# Patient Record
Sex: FEMALE | Race: WHITE | Employment: FULL TIME | URBAN - METROPOLITAN AREA
[De-identification: names, ages, dates, MRNs, and addresses within clinical notes are randomized per-mention and may not be internally consistent; named-entity substitution may affect disease eponyms.]

---

## 2017-03-07 ENCOUNTER — HOSPITAL ENCOUNTER (OUTPATIENT)
Dept: OTHER | Age: 57
Discharge: HOME | End: 2017-03-07
Attending: NURSE PRACTITIONER | Admitting: NURSE PRACTITIONER

## 2017-03-07 DIAGNOSIS — E03.9 ACQUIRED HYPOTHYROIDISM: ICD-10-CM

## 2017-03-07 DIAGNOSIS — R53.82 CHRONIC FATIGUE: ICD-10-CM

## 2017-03-07 LAB
BASOPHILS ABSOLUTE: 0 K/UL (ref 0–0.2)
BASOPHILS RELATIVE PERCENT: 0.7 %
CHOLESTEROL, TOTAL: 192 MG/DL (ref 0–199)
EOSINOPHILS ABSOLUTE: 0.1 K/UL (ref 0–0.6)
EOSINOPHILS RELATIVE PERCENT: 1.7 %
FERRITIN: 81.3 NG/ML (ref 15–150)
HCT VFR BLD CALC: 44 % (ref 36–48)
HDLC SERPL-MCNC: 63 MG/DL (ref 40–60)
HEMOGLOBIN: 14.1 G/DL (ref 12–16)
IRON SATURATION: 49 % (ref 15–50)
IRON: 158 UG/DL (ref 37–145)
LDL CHOLESTEROL CALCULATED: 111 MG/DL
LYMPHOCYTES ABSOLUTE: 1.7 K/UL (ref 1–5.1)
LYMPHOCYTES RELATIVE PERCENT: 30.9 %
MCH RBC QN AUTO: 30.6 PG (ref 26–34)
MCHC RBC AUTO-ENTMCNC: 32.1 G/DL (ref 31–36)
MCV RBC AUTO: 95.1 FL (ref 80–100)
MONOCYTES ABSOLUTE: 0.4 K/UL (ref 0–1.3)
MONOCYTES RELATIVE PERCENT: 7.6 %
NEUTROPHILS ABSOLUTE: 3.2 K/UL (ref 1.7–7.7)
NEUTROPHILS RELATIVE PERCENT: 59.1 %
PDW BLD-RTO: 12.2 % (ref 12.4–15.4)
PLATELET # BLD: 310 K/UL (ref 135–450)
PMV BLD AUTO: 9 FL (ref 5–10.5)
RBC # BLD: 4.63 M/UL (ref 4–5.2)
T3 FREE: 4.6 PG/ML (ref 2.3–4.2)
T4 FREE: 1.1 NG/DL (ref 0.9–1.8)
TOTAL IRON BINDING CAPACITY: 320 UG/DL (ref 260–445)
TRIGL SERPL-MCNC: 92 MG/DL (ref 0–150)
TSH SERPL DL<=0.05 MIU/L-ACNC: 1.95 UIU/ML (ref 0.27–4.2)
VLDLC SERPL CALC-MCNC: 18 MG/DL
WBC # BLD: 5.5 K/UL (ref 4–11)

## 2017-03-09 LAB — T3 REVERSE: 12.2 NG/DL (ref 9–27)

## 2017-03-14 ENCOUNTER — OFFICE VISIT (OUTPATIENT)
Dept: ENDOCRINOLOGY | Age: 57
End: 2017-03-14

## 2017-03-14 VITALS
SYSTOLIC BLOOD PRESSURE: 120 MMHG | OXYGEN SATURATION: 98 % | HEIGHT: 67 IN | HEART RATE: 54 BPM | WEIGHT: 173.2 LBS | BODY MASS INDEX: 27.18 KG/M2 | DIASTOLIC BLOOD PRESSURE: 76 MMHG

## 2017-03-14 DIAGNOSIS — E83.19 IRON OVERLOAD: ICD-10-CM

## 2017-03-14 DIAGNOSIS — E03.9 ACQUIRED HYPOTHYROIDISM: Primary | ICD-10-CM

## 2017-03-14 PROCEDURE — 99213 OFFICE O/P EST LOW 20 MIN: CPT | Performed by: NURSE PRACTITIONER

## 2017-03-14 RX ORDER — LEVOTHYROXINE SODIUM 0.05 MG/1
TABLET ORAL
Qty: 90 TABLET | Refills: 1 | Status: SHIPPED | OUTPATIENT
Start: 2017-03-14 | End: 2018-07-10 | Stop reason: SDUPTHER

## 2017-03-14 RX ORDER — ACETAMINOPHEN 160 MG
TABLET,DISINTEGRATING ORAL
COMMUNITY
End: 2018-07-10 | Stop reason: ALTCHOICE

## 2017-03-14 RX ORDER — CALCIUM CARBONATE 500(1250)
500 TABLET ORAL DAILY
COMMUNITY
End: 2018-07-10 | Stop reason: ALTCHOICE

## 2017-03-14 RX ORDER — NORETHINDRONE ACETATE AND ETHINYL ESTRADIOL 1; 5 MG/1; UG/1
1 TABLET ORAL DAILY
COMMUNITY

## 2017-03-14 RX ORDER — LIOTHYRONINE SODIUM 5 UG/1
TABLET ORAL
Qty: 180 TABLET | Refills: 1 | Status: SHIPPED | OUTPATIENT
Start: 2017-03-14 | End: 2017-05-09

## 2017-04-10 LAB
HPV COMMENT: NORMAL
HPV TYPE 16: NOT DETECTED
HPV TYPE 18: NOT DETECTED
HPVOH (OTHER TYPES): NOT DETECTED

## 2017-07-31 ENCOUNTER — TELEPHONE (OUTPATIENT)
Dept: INTERNAL MEDICINE | Age: 57
End: 2017-07-31

## 2017-09-19 ENCOUNTER — TELEPHONE (OUTPATIENT)
Dept: INTERNAL MEDICINE | Age: 57
End: 2017-09-19

## 2017-09-28 ENCOUNTER — OFFICE VISIT (OUTPATIENT)
Dept: INTERNAL MEDICINE | Age: 57
End: 2017-09-28

## 2017-09-28 VITALS
SYSTOLIC BLOOD PRESSURE: 116 MMHG | HEIGHT: 67 IN | DIASTOLIC BLOOD PRESSURE: 70 MMHG | WEIGHT: 175 LBS | BODY MASS INDEX: 27.47 KG/M2

## 2017-09-28 DIAGNOSIS — F51.01 PRIMARY INSOMNIA: ICD-10-CM

## 2017-09-28 DIAGNOSIS — M84.369S STRESS FRACTURE OF TIBIA, UNSPECIFIED LATERALITY, SEQUELA: ICD-10-CM

## 2017-09-28 DIAGNOSIS — R53.83 FATIGUE, UNSPECIFIED TYPE: ICD-10-CM

## 2017-09-28 DIAGNOSIS — Z00.00 WELL ADULT EXAM: Primary | ICD-10-CM

## 2017-09-28 DIAGNOSIS — E03.9 ACQUIRED HYPOTHYROIDISM: ICD-10-CM

## 2017-09-28 DIAGNOSIS — F41.9 ANXIETY: ICD-10-CM

## 2017-09-28 DIAGNOSIS — I10 ESSENTIAL HYPERTENSION: ICD-10-CM

## 2017-09-28 PROBLEM — M84.369A STRESS FRACTURE OF TIBIA: Status: ACTIVE | Noted: 2017-09-28

## 2017-09-28 PROCEDURE — 99396 PREV VISIT EST AGE 40-64: CPT | Performed by: INTERNAL MEDICINE

## 2017-09-28 RX ORDER — LORAZEPAM 0.5 MG/1
0.5 TABLET ORAL EVERY 8 HOURS PRN
Qty: 15 TABLET | Refills: 0 | Status: SHIPPED | OUTPATIENT
Start: 2017-09-28 | End: 2017-10-28

## 2017-09-28 RX ORDER — NADOLOL 20 MG/1
20 TABLET ORAL NIGHTLY
Qty: 90 TABLET | Refills: 3 | Status: SHIPPED | OUTPATIENT
Start: 2017-09-28 | End: 2018-09-02 | Stop reason: SDUPTHER

## 2017-09-28 ASSESSMENT — ENCOUNTER SYMPTOMS
BACK PAIN: 0
ABDOMINAL PAIN: 0
SHORTNESS OF BREATH: 0
COLOR CHANGE: 0
WHEEZING: 0
DIARRHEA: 0
CHEST TIGHTNESS: 0

## 2017-10-10 PROBLEM — D36.9 ADENOMATOUS POLYP: Status: ACTIVE | Noted: 2017-10-10

## 2017-10-31 ENCOUNTER — TELEPHONE (OUTPATIENT)
Dept: INTERNAL MEDICINE | Age: 57
End: 2017-10-31

## 2017-10-31 DIAGNOSIS — F41.9 ANXIETY: ICD-10-CM

## 2017-10-31 RX ORDER — CITALOPRAM 20 MG/1
20 TABLET ORAL NIGHTLY
Qty: 30 TABLET | Refills: 3 | Status: SHIPPED | OUTPATIENT
Start: 2017-10-31 | End: 2018-02-19 | Stop reason: SDUPTHER

## 2017-10-31 NOTE — TELEPHONE ENCOUNTER
Patient was returning MD called please call pt on her cell phone and you can leave a voice mail [phone]

## 2018-02-19 RX ORDER — CITALOPRAM 20 MG/1
TABLET ORAL
Qty: 30 TABLET | Refills: 0 | Status: SHIPPED | OUTPATIENT
Start: 2018-02-19 | End: 2018-03-18 | Stop reason: SDUPTHER

## 2018-02-26 ENCOUNTER — HOSPITAL ENCOUNTER (OUTPATIENT)
Dept: ENDOSCOPY | Age: 58
Discharge: HOME | End: 2018-02-26
Attending: OBSTETRICS & GYNECOLOGY | Admitting: OBSTETRICS & GYNECOLOGY

## 2018-02-26 NOTE — ANESTHESIA PRE-OP
Department of Anesthesiology  Preprocedure Note       Name:  Devora Reynolds   Age:  56 y.o.  :  1960                                          MRN:  [id number]         Date:  2018      Surgeon:    Procedure:    Medications prior to admission:   Prior to Admission medications    Medication Sig Start Date End Date Taking?  Authorizing Provider   citalopram (CELEXA) 20 MG tablet TAKE 1 TABLET BY MOUTH EVERY NIGHT 18   Rockland Leyden, MD   nadolol (CORGARD) 20 MG tablet Take 1 tablet by mouth nightly 17   Rockland Leyden, MD   liothyronine (CYTOMEL) 5 MCG tablet TAKE 2 TABLETS BY MOUTH DAILY 17   RODNEY Modi   norethindrone-ethinyl estradiol (JINTELI) 1-5 MG-MCG TABS per tablet Take 1 tablet by mouth daily    Historical Provider, MD   Multiple Vitamins-Minerals (MULTIVITAMIN ADULT PO) Take by mouth    Historical Provider, MD   calcium carbonate (OSCAL) 500 MG TABS tablet Take 500 mg by mouth daily    Historical Provider, MD   Cholecalciferol (VITAMIN D3) 2000 UNITS CAPS Take by mouth    Historical Provider, MD   Probiotic Product (PROBIOTIC ADVANCED PO) Take by mouth    Historical Provider, MD   levothyroxine (SYNTHROID) 50 MCG tablet TAKE 1 TABLET BY MOUTH DAILY 3/14/17   RODNEY Modi   naproxen (NAPROSYN) 500 MG tablet Take 500 mg by mouth as needed     Historical Provider, MD   loratadine (CLARITIN) 10 MG tablet Take 10 mg by mouth    Historical Provider, MD       Current medications:    Current Outpatient Prescriptions   Medication Sig Dispense Refill    citalopram (CELEXA) 20 MG tablet TAKE 1 TABLET BY MOUTH EVERY NIGHT 30 tablet 0    nadolol (CORGARD) 20 MG tablet Take 1 tablet by mouth nightly 90 tablet 3    liothyronine (CYTOMEL) 5 MCG tablet TAKE 2 TABLETS BY MOUTH DAILY 60 tablet 2    norethindrone-ethinyl estradiol (JINTELI) 1-5 MG-MCG TABS per tablet Take 1 tablet by mouth daily      Multiple Vitamins-Minerals (MULTIVITAMIN ADULT PO) Take by mouth      calcium carbonate (OSCAL) 500 MG TABS tablet Take 500 mg by mouth daily      Cholecalciferol (VITAMIN D3) 2000 UNITS CAPS Take by mouth      Probiotic Product (PROBIOTIC ADVANCED PO) Take by mouth      levothyroxine (SYNTHROID) 50 MCG tablet TAKE 1 TABLET BY MOUTH DAILY 90 tablet 1    naproxen (NAPROSYN) 500 MG tablet Take 500 mg by mouth as needed       loratadine (CLARITIN) 10 MG tablet Take 10 mg by mouth       No current facility-administered medications for this encounter.         Allergies:  No Known Allergies    Problem List:    Patient Active Problem List   Diagnosis Code    Foot pain M79.673    Family history of diabetes mellitus Z83.3    Hypertension I10    Mitral valve prolapse I34.1    Early menopause E28.319    Tendonitis, tibialis M76.829    Well adult exam Z00.00    PAC (premature atrial contraction) I49.1    Insomnia G47.00    Anxiety F41.9    Visual disturbance H53.9    Ocular muscular dystrophy ([address] G71.0    Headache R51    Fatigue R53.83    Bilateral knee pain M25.561, M25.562    Muscle tightness hamstrings M62.89    Chondromalacia patellae of left knee M22.42    Right knee pain M25.561    Left knee pain M25.562    Bilateral closed proximal tibial stress fracture S82.101A, S82.102A    Hypothyroidism E03.9    Osteopenia M85.80    Menopausal state N95.1    Constipation K59.00    Stress fracture of tibia M84.369A    Adenomatous polyp D36.9       Past Medical History:        Diagnosis Date    Hypothyroidism     Menopausal state age 48    MVP (mitral valve prolapse)     last echo 9/2006 showed MVP otherwise nl-no regurg or stenosis of MV    Osteopenia 2010       Past Surgical History:        Procedure Laterality Date    BREAST ENHANCEMENT SURGERY      x3    COLONOSCOPY  2013    adenomatous polyp 2-13 for 7 yr marva    DILATION AND CURETTAGE OF UTERUS         Social History:    Social History   Substance Use Topics    Smoking status: Never Smoker    Smokeless tobacco:

## 2018-02-28 ENCOUNTER — HOSPITAL ENCOUNTER (OUTPATIENT)
Dept: MAMMOGRAPHY | Age: 58
Discharge: HOME | End: 2018-02-28
Attending: OBSTETRICS & GYNECOLOGY | Admitting: OBSTETRICS & GYNECOLOGY

## 2018-02-28 DIAGNOSIS — Z12.39 BREAST CANCER SCREENING: ICD-10-CM

## 2018-07-10 ENCOUNTER — OFFICE VISIT (OUTPATIENT)
Dept: ENDOCRINOLOGY | Age: 58
End: 2018-07-10

## 2018-07-10 VITALS
WEIGHT: 180 LBS | HEART RATE: 55 BPM | SYSTOLIC BLOOD PRESSURE: 116 MMHG | DIASTOLIC BLOOD PRESSURE: 72 MMHG | OXYGEN SATURATION: 98 % | HEIGHT: 67 IN | BODY MASS INDEX: 28.25 KG/M2

## 2018-07-10 DIAGNOSIS — E04.9 GOITER: ICD-10-CM

## 2018-07-10 DIAGNOSIS — M85.80 OSTEOPENIA, UNSPECIFIED LOCATION: ICD-10-CM

## 2018-07-10 DIAGNOSIS — Z78.0 MENOPAUSE: ICD-10-CM

## 2018-07-10 DIAGNOSIS — E03.9 ACQUIRED HYPOTHYROIDISM: Primary | ICD-10-CM

## 2018-07-10 PROCEDURE — 99214 OFFICE O/P EST MOD 30 MIN: CPT | Performed by: INTERNAL MEDICINE

## 2018-07-10 RX ORDER — LIOTHYRONINE SODIUM 5 UG/1
TABLET ORAL
Qty: 60 TABLET | Refills: 5 | Status: SHIPPED | OUTPATIENT
Start: 2018-07-10 | End: 2019-01-11 | Stop reason: SDUPTHER

## 2018-07-10 RX ORDER — LEVOTHYROXINE SODIUM 0.05 MG/1
TABLET ORAL
Qty: 30 TABLET | Refills: 5 | Status: SHIPPED | OUTPATIENT
Start: 2018-07-10 | End: 2019-01-11 | Stop reason: SDUPTHER

## 2018-07-10 ASSESSMENT — PATIENT HEALTH QUESTIONNAIRE - PHQ9
SUM OF ALL RESPONSES TO PHQ QUESTIONS 1-9: 0
1. LITTLE INTEREST OR PLEASURE IN DOING THINGS: 0
2. FEELING DOWN, DEPRESSED OR HOPELESS: 0
SUM OF ALL RESPONSES TO PHQ9 QUESTIONS 1 & 2: 0

## 2018-07-10 NOTE — PROGRESS NOTES
has depression, has insomnia  Hematologic/Lymphatic: no tendency for easy bleeding, no swollen lymph nodes, no tendency for easy bruising  Immunology: has seasonal allergies, no frequent infections, no frequent illnesses  Endocrine: no temperature intolerance, has hot flashes    /72 (Site: Left Arm, Position: Sitting, Cuff Size: Medium Adult)   Pulse 55   Ht 5' 7\" (1.702 m)   Wt 180 lb (81.6 kg)   SpO2 98%   BMI 28.19 kg/m²    Wt Readings from Last 3 Encounters:   07/10/18 180 lb (81.6 kg)   02/07/18 170 lb (77.1 kg)   09/28/17 175 lb (79.4 kg)     Body mass index is 28.19 kg/m².     OBJECTIVE:  Constitutional: no acute distress, well appearing and well nourished  Psychiatric: oriented to person, place and time, judgement and insight and normal, recent and remote memory and intact and mood and affect are normal  Skin: skin and subcutaneous tissue is normal without mass, normal turgor  Head and Face: examination of head and face revealed no abnormalities  Eyes: no lid or conjunctival swelling, erythema or discharge, pupils are normal, equal, round, reactive to light  Ears/Nose: external inspection of ears and nose revealed no abnormalities, hearing is grossly normal  Oropharynx/Mouth/Face: lips, tongue and gums are normal with no lesions, the voice quality was normal  Neck: neck is supple and symmetric, with midline trachea and no masses, thyroid is enlarged  Lymphatics: normal cervical lymph nodes, normal supraclavicular nodes  Pulmonary: no increased work of breathing or signs of respiratory distress, lungs are clear to auscultation  Cardiovascular: normal heart rate and rhythm, normal S1 and S2, no murmurs and pedal pulses and 2+ bilaterally, No edema  Abdomen: abdomen is soft, non-tender with no masses  Musculoskeletal: normal gait and station and exam of the digits and nails are normal  Neurological: normal coordination and normal general cortical function      Lab Review:    Lab Results   Component

## 2018-07-19 ENCOUNTER — HOSPITAL ENCOUNTER (OUTPATIENT)
Dept: OTHER | Age: 58
Discharge: HOME | End: 2018-07-19
Attending: INTERNAL MEDICINE | Admitting: INTERNAL MEDICINE

## 2018-07-19 DIAGNOSIS — E03.9 ACQUIRED HYPOTHYROIDISM: ICD-10-CM

## 2018-07-19 LAB
A/G RATIO: 1.4 (ref 1.1–2.2)
ALBUMIN SERPL-MCNC: 4.1 G/DL (ref 3.4–5)
ALP BLD-CCNC: 80 U/L (ref 40–129)
ALT SERPL-CCNC: 16 U/L (ref 10–40)
ANION GAP SERPL CALCULATED.3IONS-SCNC: 10 MMOL/L (ref 3–16)
AST SERPL-CCNC: 21 U/L (ref 15–37)
BILIRUB SERPL-MCNC: 0.5 MG/DL (ref 0–1)
BUN BLDV-MCNC: 12 MG/DL (ref 7–20)
CALCIUM SERPL-MCNC: 9.1 MG/DL (ref 8.3–10.6)
CHLORIDE BLD-SCNC: 105 MMOL/L (ref 99–110)
CO2: 27 MMOL/L (ref 21–32)
CREAT SERPL-MCNC: 1 MG/DL (ref 0.6–1.1)
GFR AFRICAN AMERICAN: >60
GFR NON-AFRICAN AMERICAN: 57
GLOBULIN: 2.9 G/DL
GLUCOSE BLD-MCNC: 85 MG/DL (ref 70–99)
POTASSIUM SERPL-SCNC: 4.3 MMOL/L (ref 3.5–5.1)
SODIUM BLD-SCNC: 142 MMOL/L (ref 136–145)
T3 FREE: 3.1 PG/ML (ref 2.3–4.2)
T4 FREE: 1 NG/DL (ref 0.9–1.8)
TOTAL PROTEIN: 7 G/DL (ref 6.4–8.2)
TSH SERPL DL<=0.05 MIU/L-ACNC: 2.55 UIU/ML (ref 0.27–4.2)

## 2018-07-21 LAB — T3 REVERSE: 13.3 NG/DL (ref 9–27)

## 2018-07-23 ENCOUNTER — HOSPITAL ENCOUNTER (OUTPATIENT)
Dept: GENERAL RADIOLOGY | Age: 58
Discharge: HOME | End: 2018-07-23
Admitting: INTERNAL MEDICINE

## 2018-07-23 DIAGNOSIS — M85.80 OSTEOPENIA, UNSPECIFIED LOCATION: ICD-10-CM

## 2018-07-23 DIAGNOSIS — N95.1 FEMALE CLIMACTERIC STATE: ICD-10-CM

## 2018-07-23 DIAGNOSIS — E04.9 GOITER: ICD-10-CM

## 2018-07-23 DIAGNOSIS — Z78.0 MENOPAUSE: ICD-10-CM

## 2018-09-04 RX ORDER — NADOLOL 20 MG/1
TABLET ORAL
Qty: 90 TABLET | Refills: 0 | Status: SHIPPED | OUTPATIENT
Start: 2018-09-04 | End: 2018-12-02 | Stop reason: SDUPTHER

## 2018-10-08 RX ORDER — CITALOPRAM 20 MG/1
TABLET ORAL
Qty: 30 TABLET | Refills: 2 | Status: SHIPPED | OUTPATIENT
Start: 2018-10-08 | End: 2019-01-03 | Stop reason: SDUPTHER

## 2019-01-11 ENCOUNTER — OFFICE VISIT (OUTPATIENT)
Dept: ENDOCRINOLOGY | Age: 59
End: 2019-01-11
Payer: COMMERCIAL

## 2019-01-11 ENCOUNTER — OFFICE VISIT (OUTPATIENT)
Dept: INTERNAL MEDICINE CLINIC | Age: 59
End: 2019-01-11
Payer: COMMERCIAL

## 2019-01-11 VITALS
SYSTOLIC BLOOD PRESSURE: 118 MMHG | BODY MASS INDEX: 28.09 KG/M2 | DIASTOLIC BLOOD PRESSURE: 78 MMHG | HEIGHT: 67 IN | WEIGHT: 179 LBS

## 2019-01-11 VITALS
SYSTOLIC BLOOD PRESSURE: 126 MMHG | WEIGHT: 178.8 LBS | HEIGHT: 67 IN | DIASTOLIC BLOOD PRESSURE: 60 MMHG | HEART RATE: 66 BPM | BODY MASS INDEX: 28.06 KG/M2

## 2019-01-11 DIAGNOSIS — S92.355A NONDISPLACED FRACTURE OF FIFTH METATARSAL BONE, LEFT FOOT, INITIAL ENCOUNTER FOR CLOSED FRACTURE: ICD-10-CM

## 2019-01-11 DIAGNOSIS — E03.9 ACQUIRED HYPOTHYROIDISM: ICD-10-CM

## 2019-01-11 DIAGNOSIS — D36.9 ADENOMATOUS POLYP: ICD-10-CM

## 2019-01-11 DIAGNOSIS — E03.9 ACQUIRED HYPOTHYROIDISM: Primary | ICD-10-CM

## 2019-01-11 DIAGNOSIS — F41.9 ANXIETY: ICD-10-CM

## 2019-01-11 DIAGNOSIS — I10 ESSENTIAL HYPERTENSION: ICD-10-CM

## 2019-01-11 DIAGNOSIS — Z00.00 WELL ADULT EXAM: Primary | ICD-10-CM

## 2019-01-11 DIAGNOSIS — M85.80 OSTEOPENIA, UNSPECIFIED LOCATION: ICD-10-CM

## 2019-01-11 DIAGNOSIS — Z78.0 MENOPAUSE: ICD-10-CM

## 2019-01-11 PROCEDURE — 99396 PREV VISIT EST AGE 40-64: CPT | Performed by: INTERNAL MEDICINE

## 2019-01-11 PROCEDURE — 99214 OFFICE O/P EST MOD 30 MIN: CPT | Performed by: INTERNAL MEDICINE

## 2019-01-11 RX ORDER — LEVOTHYROXINE SODIUM 0.05 MG/1
TABLET ORAL
Qty: 30 TABLET | Refills: 5 | Status: SHIPPED | OUTPATIENT
Start: 2019-01-11 | End: 2019-10-01 | Stop reason: SDUPTHER

## 2019-01-11 RX ORDER — LIOTHYRONINE SODIUM 5 UG/1
TABLET ORAL
Qty: 60 TABLET | Refills: 5 | Status: SHIPPED | OUTPATIENT
Start: 2019-01-11 | End: 2019-10-01 | Stop reason: SDUPTHER

## 2019-01-11 RX ORDER — NADOLOL 20 MG/1
TABLET ORAL
Qty: 90 TABLET | Refills: 3 | Status: SHIPPED | OUTPATIENT
Start: 2019-01-11 | End: 2019-03-01 | Stop reason: SDUPTHER

## 2019-01-11 RX ORDER — CITALOPRAM 20 MG/1
TABLET ORAL
Qty: 90 TABLET | Refills: 3 | Status: SHIPPED | OUTPATIENT
Start: 2019-01-11 | End: 2020-01-02

## 2019-01-12 ASSESSMENT — ENCOUNTER SYMPTOMS
COLOR CHANGE: 0
CHEST TIGHTNESS: 0
WHEEZING: 0
BACK PAIN: 0
ABDOMINAL PAIN: 0

## 2019-02-17 ENCOUNTER — TELEPHONE (OUTPATIENT)
Dept: INTERNAL MEDICINE CLINIC | Age: 59
End: 2019-02-17

## 2019-02-22 ENCOUNTER — HOSPITAL ENCOUNTER (OUTPATIENT)
Dept: OCCUPATIONAL THERAPY | Age: 59
Setting detail: THERAPIES SERIES
Discharge: HOME | End: 2019-02-22
Payer: COMMERCIAL

## 2019-02-22 PROCEDURE — 97165 OT EVAL LOW COMPLEX 30 MIN: CPT

## 2019-02-22 PROCEDURE — 97018 PARAFFIN BATH THERAPY: CPT

## 2019-02-22 PROCEDURE — 97530 THERAPEUTIC ACTIVITIES: CPT

## 2019-02-22 ASSESSMENT — 9 HOLE PEG TEST
TEST_RESULT: FUNCTIONAL
TESTTIME_SECONDS: 22

## 2019-03-01 ENCOUNTER — APPOINTMENT (OUTPATIENT)
Dept: OCCUPATIONAL THERAPY | Age: 59
End: 2019-03-01
Payer: COMMERCIAL

## 2019-03-01 RX ORDER — NADOLOL 20 MG/1
TABLET ORAL
Qty: 90 TABLET | Refills: 0 | Status: SHIPPED | OUTPATIENT
Start: 2019-03-01 | End: 2020-01-31

## 2019-03-04 ENCOUNTER — HOSPITAL ENCOUNTER (OUTPATIENT)
Dept: WOMENS IMAGING | Age: 59
Discharge: HOME | End: 2019-03-04
Payer: COMMERCIAL

## 2019-03-04 ENCOUNTER — APPOINTMENT (OUTPATIENT)
Dept: OCCUPATIONAL THERAPY | Age: 59
End: 2019-03-04
Payer: COMMERCIAL

## 2019-03-04 DIAGNOSIS — Z12.39 BREAST CANCER SCREENING: ICD-10-CM

## 2019-03-04 PROCEDURE — 77067 SCR MAMMO BI INCL CAD: CPT

## 2019-03-06 ENCOUNTER — HOSPITAL ENCOUNTER (OUTPATIENT)
Dept: OCCUPATIONAL THERAPY | Age: 59
Setting detail: THERAPIES SERIES
Discharge: HOME | End: 2019-03-06
Payer: COMMERCIAL

## 2019-03-06 PROCEDURE — 97530 THERAPEUTIC ACTIVITIES: CPT

## 2019-03-06 PROCEDURE — 97140 MANUAL THERAPY 1/> REGIONS: CPT

## 2019-03-08 ENCOUNTER — HOSPITAL ENCOUNTER (OUTPATIENT)
Dept: OCCUPATIONAL THERAPY | Age: 59
Setting detail: THERAPIES SERIES
Discharge: HOME | End: 2019-03-08
Payer: COMMERCIAL

## 2019-03-08 PROCEDURE — 97110 THERAPEUTIC EXERCISES: CPT

## 2019-03-11 ENCOUNTER — HOSPITAL ENCOUNTER (OUTPATIENT)
Dept: OCCUPATIONAL THERAPY | Age: 59
Setting detail: THERAPIES SERIES
Discharge: HOME | End: 2019-03-11
Payer: COMMERCIAL

## 2019-03-11 PROCEDURE — 97530 THERAPEUTIC ACTIVITIES: CPT

## 2019-03-11 PROCEDURE — 97018 PARAFFIN BATH THERAPY: CPT

## 2019-03-13 ENCOUNTER — HOSPITAL ENCOUNTER (OUTPATIENT)
Dept: OCCUPATIONAL THERAPY | Age: 59
Setting detail: THERAPIES SERIES
Discharge: HOME | End: 2019-03-13
Payer: COMMERCIAL

## 2019-03-13 PROCEDURE — 97530 THERAPEUTIC ACTIVITIES: CPT

## 2019-08-08 PROBLEM — E55.9 VITAMIN D DEFICIENCY: Status: ACTIVE | Noted: 2019-08-08

## 2019-08-09 ENCOUNTER — OFFICE VISIT (OUTPATIENT)
Dept: ENDOCRINOLOGY | Age: 59
End: 2019-08-09
Payer: COMMERCIAL

## 2019-08-09 VITALS
HEIGHT: 67 IN | SYSTOLIC BLOOD PRESSURE: 104 MMHG | DIASTOLIC BLOOD PRESSURE: 62 MMHG | WEIGHT: 182 LBS | HEART RATE: 69 BPM | BODY MASS INDEX: 28.56 KG/M2 | OXYGEN SATURATION: 95 %

## 2019-08-09 DIAGNOSIS — Z78.0 MENOPAUSE: ICD-10-CM

## 2019-08-09 DIAGNOSIS — E03.9 ACQUIRED HYPOTHYROIDISM: Primary | ICD-10-CM

## 2019-08-09 DIAGNOSIS — E55.9 VITAMIN D DEFICIENCY: ICD-10-CM

## 2019-08-09 PROCEDURE — 99214 OFFICE O/P EST MOD 30 MIN: CPT | Performed by: INTERNAL MEDICINE

## 2019-08-09 NOTE — PROGRESS NOTES
are present.       Cervical lymphadenopathy: No abnormal lymph nodes in the imaged portions of   the neck.           Impression   Unremarkable thyroid ultrasound.          Past Medical History:   Diagnosis Date    Hypothyroidism     Menopausal state age 48    MVP (mitral valve prolapse)     last echo 9/2006 showed MVP otherwise nl-no regurg or stenosis of MV    Osteopenia 2010     Patient Active Problem List    Diagnosis Date Noted    Vitamin D deficiency 08/08/2019    Nondisplaced fracture of fifth metatarsal bone, left foot, initial encounter for closed fracture 01/11/2019    Adenomatous polyp 10/10/2017    Stress fracture of tibia 09/28/2017    Constipation 12/18/2015    Hypothyroidism     Osteopenia     Menopause     Bilateral closed proximal tibial stress fracture 08/06/2015    Chondromalacia patellae of left knee 07/21/2015    Bilateral knee pain 07/14/2015    Fatigue 04/27/2015    Visual disturbance 03/29/2013    Ocular muscular dystrophy ([address] 03/29/2013    Headache 03/29/2013    Insomnia 11/07/2011    Anxiety 11/07/2011    PAC (premature atrial contraction) 01/09/2011    Foot pain 10/13/2010    Family history of diabetes mellitus 10/13/2010    Hypertension 10/13/2010    Mitral valve prolapse 10/13/2010    Menopause 10/13/2010    Tendonitis, tibialis 10/13/2010     Past Surgical History:   Procedure Laterality Date    BREAST ENHANCEMENT SURGERY Bilateral     91, 00, 04    COLONOSCOPY  2013    adenomatous polyp 2-13 for 7 yr marva    DILATION AND CURETTAGE OF UTERUS       Family History   Problem Relation Age of Onset    Thyroid Disease Mother     High Cholesterol Mother     Hypertension Mother     Other Father         Atherosclerotic Disease; Spastic paraparesis of legs     No Known Problems Brother     No Known Problems Son     No Known Problems Daughter      Social History     Socioeconomic History    Marital status:      Spouse name: None    Number of children: None    Years of education: None    Highest education level: None   Occupational History    None   Social Needs    Financial resource strain: None    Food insecurity:     Worry: None     Inability: None    Transportation needs:     Medical: None     Non-medical: None   Tobacco Use    Smoking status: Never Smoker    Smokeless tobacco: Never Used   Substance and Sexual Activity    Alcohol use: Yes     Comment: social    Drug use: No    Sexual activity: Yes     Partners: Male   Lifestyle    Physical activity:     Days per week: None     Minutes per session: None    Stress: None   Relationships    Social connections:     Talks on phone: None     Gets together: None     Attends Hinduism service: None     Active member of club or organization: None     Attends meetings of clubs or organizations: None     Relationship status: None    Intimate partner violence:     Fear of current or ex partner: None     Emotionally abused: None     Physically abused: None     Forced sexual activity: None   Other Topics Concern    None   Social History Narrative    None     Current Outpatient Medications   Medication Sig Dispense Refill    nadolol (CORGARD) 20 MG tablet TAKE 1 TABLET BY MOUTH EVERY NIGHT 90 tablet 0    levothyroxine (SYNTHROID) 50 MCG tablet TAKE 1 TABLET BY MOUTH DAILY 30 tablet 5    liothyronine (CYTOMEL) 5 MCG tablet TAKE 2 TABLETS BY MOUTH DAILY 60 tablet 5    citalopram (CELEXA) 20 MG tablet TAKE 1 TABLET BY MOUTH EVERY NIGHT AT BEDTIME 90 tablet 3    norethindrone-ethinyl estradiol (JINTELI) 1-5 MG-MCG TABS per tablet Take 1 tablet by mouth daily       No current facility-administered medications for this visit. No Known Allergies  Family Status   Relation Name Status    Mother  Alive        HTN,chronic renal disease,xsmoker    Father          HTN-paraplegic after fastpitch injury age 13!     PGM          ASCAD/pancreatic ca    MGM          DM    PGF   07/19/2018    AST 21 07/19/2018    ALT 16 07/19/2018    LABGLOM 57 07/19/2018    LABGLOM 64 10/24/2013    GFRAA >60 07/19/2018    AGRATIO 1.4 07/19/2018    GLOB 2.9 07/19/2018     Lab Results   Component Value Date    TSH 2.55 07/19/2018    FT3 3.1 07/19/2018     Lab Results   Component Value Date    LABA1C 5.1 10/24/2013     No results found for: EAG  Lab Results   Component Value Date    CHOL 192 03/07/2017     Lab Results   Component Value Date    TRIG 92 03/07/2017     Lab Results   Component Value Date    HDL 63 03/07/2017     Lab Results   Component Value Date    LDLCALC 111 03/07/2017     Lab Results   Component Value Date    LABVLDL 18 03/07/2017     Lab Results   Component Value Date    CHOLHDLRATIO 2.7 10/24/2013     Lab Results   Component Value Date    LABMICR <0.2 11/19/2010     Lab Results   Component Value Date    VITD25 59.6 09/28/2017        ASSESSMENT/PLAN:  1. Acquired hypothyroidism  TSH 2.5  Levothyroxine 0.05 mg qd, liothyronine 5 mcg 2 tabs daily.  - TSH without Reflex; Future  - T4, Free; Future  - T3, Free; Future  - Comprehensive Metabolic Panel; Future  - T3, Reverse; Future    2. Menopause  Calcium, vitamin D  - DEXA Bone Density Axial Skeleton; Future    3. Osteopenia, unspecified location  Calcium, vitamin D  - DEXA Bone Density Axial Skeleton; Future    4. Vitamin D deficiency  25 hydroxy vitamin D 29  Vitamin D supplement 2000 IU qd.   Bone no bone pain  New problem    Reviewed and/or ordered clinical lab results Yes  Reviewed and/or ordered radiology tests Yes   Reviewed and/or ordered other diagnostic tests No  Discussed test results with performing physician No  Independently reviewed image, tracing, or specimen No  Made a decision to obtain old records No  Reviewed and summarized old records Yes  Obtained history from other than patient No    Danay Notice was counseled regarding symptoms of thyroid, osteopenia, vitamin D deficiency diagnosis, course and complications of

## 2019-10-01 DIAGNOSIS — E03.9 ACQUIRED HYPOTHYROIDISM: ICD-10-CM

## 2019-10-02 RX ORDER — LIOTHYRONINE SODIUM 5 UG/1
TABLET ORAL
Qty: 60 TABLET | Refills: 3 | Status: SHIPPED | OUTPATIENT
Start: 2019-10-02 | End: 2020-01-31

## 2019-10-02 RX ORDER — LEVOTHYROXINE SODIUM 0.05 MG/1
TABLET ORAL
Qty: 30 TABLET | Refills: 3 | Status: SHIPPED | OUTPATIENT
Start: 2019-10-02 | End: 2020-01-31

## 2019-10-07 ENCOUNTER — OFFICE VISIT (OUTPATIENT)
Dept: INTERNAL MEDICINE CLINIC | Age: 59
End: 2019-10-07
Payer: COMMERCIAL

## 2019-10-07 VITALS — BODY MASS INDEX: 25.84 KG/M2 | WEIGHT: 165 LBS | DIASTOLIC BLOOD PRESSURE: 80 MMHG | SYSTOLIC BLOOD PRESSURE: 110 MMHG

## 2019-10-07 DIAGNOSIS — F51.01 PRIMARY INSOMNIA: ICD-10-CM

## 2019-10-07 DIAGNOSIS — M54.50 ACUTE LEFT-SIDED LOW BACK PAIN WITHOUT SCIATICA: ICD-10-CM

## 2019-10-07 DIAGNOSIS — I10 ESSENTIAL HYPERTENSION: ICD-10-CM

## 2019-10-07 DIAGNOSIS — E03.9 ACQUIRED HYPOTHYROIDISM: ICD-10-CM

## 2019-10-07 DIAGNOSIS — F41.9 ANXIETY: ICD-10-CM

## 2019-10-07 PROCEDURE — 99214 OFFICE O/P EST MOD 30 MIN: CPT | Performed by: INTERNAL MEDICINE

## 2019-10-07 RX ORDER — DICLOFENAC SODIUM 75 MG/1
75 TABLET, DELAYED RELEASE ORAL 2 TIMES DAILY
Qty: 60 TABLET | Refills: 3 | Status: SHIPPED | OUTPATIENT
Start: 2019-10-07 | End: 2022-01-14

## 2019-10-08 PROBLEM — M54.50 LOW BACK PAIN: Status: ACTIVE | Noted: 2019-10-08

## 2019-10-08 ASSESSMENT — ENCOUNTER SYMPTOMS
CHEST TIGHTNESS: 0
COLOR CHANGE: 0
BACK PAIN: 1
WHEEZING: 0
ABDOMINAL PAIN: 0

## 2019-12-10 ENCOUNTER — TELEPHONE (OUTPATIENT)
Dept: INTERNAL MEDICINE CLINIC | Age: 59
End: 2019-12-10

## 2020-01-02 RX ORDER — CITALOPRAM 20 MG/1
TABLET ORAL
Qty: 90 TABLET | Refills: 3 | Status: SHIPPED | OUTPATIENT
Start: 2020-01-02 | End: 2021-01-29

## 2020-01-11 DIAGNOSIS — E03.9 ACQUIRED HYPOTHYROIDISM: ICD-10-CM

## 2020-01-11 DIAGNOSIS — I10 ESSENTIAL HYPERTENSION: ICD-10-CM

## 2020-01-11 DIAGNOSIS — E55.9 VITAMIN D DEFICIENCY: ICD-10-CM

## 2020-01-11 DIAGNOSIS — Z00.00 WELL ADULT EXAM: ICD-10-CM

## 2020-01-11 LAB
A/G RATIO: 1.8 (ref 1.1–2.2)
ALBUMIN SERPL-MCNC: 4.2 G/DL (ref 3.4–5)
ALP BLD-CCNC: 67 U/L (ref 40–129)
ALT SERPL-CCNC: 13 U/L (ref 10–40)
ANION GAP SERPL CALCULATED.3IONS-SCNC: 15 MMOL/L (ref 3–16)
AST SERPL-CCNC: 18 U/L (ref 15–37)
BASOPHILS ABSOLUTE: 0.1 K/UL (ref 0–0.2)
BASOPHILS RELATIVE PERCENT: 2.2 %
BILIRUB SERPL-MCNC: 0.5 MG/DL (ref 0–1)
BUN BLDV-MCNC: 11 MG/DL (ref 7–20)
CALCIUM SERPL-MCNC: 9.6 MG/DL (ref 8.3–10.6)
CHLORIDE BLD-SCNC: 105 MMOL/L (ref 99–110)
CHOLESTEROL, TOTAL: 135 MG/DL (ref 0–199)
CO2: 22 MMOL/L (ref 21–32)
CREAT SERPL-MCNC: 0.9 MG/DL (ref 0.6–1.1)
EOSINOPHILS ABSOLUTE: 0.1 K/UL (ref 0–0.6)
EOSINOPHILS RELATIVE PERCENT: 1.4 %
GFR AFRICAN AMERICAN: >60
GFR NON-AFRICAN AMERICAN: >60
GLOBULIN: 2.4 G/DL
GLUCOSE BLD-MCNC: 101 MG/DL (ref 70–99)
HCT VFR BLD CALC: 43.2 % (ref 36–48)
HDLC SERPL-MCNC: 54 MG/DL (ref 40–60)
HEMOGLOBIN: 14.5 G/DL (ref 12–16)
LDL CHOLESTEROL CALCULATED: 66 MG/DL
LYMPHOCYTES ABSOLUTE: 1.9 K/UL (ref 1–5.1)
LYMPHOCYTES RELATIVE PERCENT: 36.2 %
MCH RBC QN AUTO: 32.6 PG (ref 26–34)
MCHC RBC AUTO-ENTMCNC: 33.5 G/DL (ref 31–36)
MCV RBC AUTO: 97.2 FL (ref 80–100)
MONOCYTES ABSOLUTE: 0.4 K/UL (ref 0–1.3)
MONOCYTES RELATIVE PERCENT: 7.7 %
NEUTROPHILS ABSOLUTE: 2.7 K/UL (ref 1.7–7.7)
NEUTROPHILS RELATIVE PERCENT: 52.5 %
PDW BLD-RTO: 12.8 % (ref 12.4–15.4)
PLATELET # BLD: 255 K/UL (ref 135–450)
PMV BLD AUTO: 10 FL (ref 5–10.5)
POTASSIUM SERPL-SCNC: 4.1 MMOL/L (ref 3.5–5.1)
RBC # BLD: 4.45 M/UL (ref 4–5.2)
SODIUM BLD-SCNC: 142 MMOL/L (ref 136–145)
T4 FREE: 1.1 NG/DL (ref 0.9–1.8)
TOTAL PROTEIN: 6.6 G/DL (ref 6.4–8.2)
TRIGL SERPL-MCNC: 74 MG/DL (ref 0–150)
TSH SERPL DL<=0.05 MIU/L-ACNC: 1.43 UIU/ML (ref 0.27–4.2)
VITAMIN D 25-HYDROXY: 37.7 NG/ML
VLDLC SERPL CALC-MCNC: 15 MG/DL
WBC # BLD: 5.2 K/UL (ref 4–11)

## 2020-01-29 ENCOUNTER — OFFICE VISIT (OUTPATIENT)
Dept: INTERNAL MEDICINE CLINIC | Age: 60
End: 2020-01-29
Payer: COMMERCIAL

## 2020-01-29 VITALS
HEIGHT: 67 IN | SYSTOLIC BLOOD PRESSURE: 102 MMHG | DIASTOLIC BLOOD PRESSURE: 68 MMHG | WEIGHT: 177 LBS | BODY MASS INDEX: 27.78 KG/M2

## 2020-01-29 PROBLEM — S92.355A NONDISPLACED FRACTURE OF FIFTH METATARSAL BONE, LEFT FOOT, INITIAL ENCOUNTER FOR CLOSED FRACTURE: Status: RESOLVED | Noted: 2019-01-11 | Resolved: 2020-01-29

## 2020-01-29 PROBLEM — M84.369A STRESS FRACTURE OF TIBIA: Status: RESOLVED | Noted: 2017-09-28 | Resolved: 2020-01-29

## 2020-01-29 PROCEDURE — 93000 ELECTROCARDIOGRAM COMPLETE: CPT | Performed by: INTERNAL MEDICINE

## 2020-01-29 PROCEDURE — 99396 PREV VISIT EST AGE 40-64: CPT | Performed by: INTERNAL MEDICINE

## 2020-01-29 RX ORDER — LORAZEPAM 0.5 MG/1
0.5 TABLET ORAL EVERY 8 HOURS PRN
Qty: 10 TABLET | Refills: 0 | Status: SHIPPED | OUTPATIENT
Start: 2020-01-29 | End: 2020-02-03

## 2020-01-29 ASSESSMENT — PATIENT HEALTH QUESTIONNAIRE - PHQ9
2. FEELING DOWN, DEPRESSED OR HOPELESS: 0
1. LITTLE INTEREST OR PLEASURE IN DOING THINGS: 0
SUM OF ALL RESPONSES TO PHQ QUESTIONS 1-9: 0
SUM OF ALL RESPONSES TO PHQ QUESTIONS 1-9: 0
SUM OF ALL RESPONSES TO PHQ9 QUESTIONS 1 & 2: 0

## 2020-01-29 ASSESSMENT — ENCOUNTER SYMPTOMS
BACK PAIN: 0
ABDOMINAL PAIN: 0
WHEEZING: 0
CHEST TIGHTNESS: 0
COLOR CHANGE: 0

## 2020-01-29 NOTE — PROGRESS NOTES
Subjective:      Patient ID: Marah Lopez is a 64 y.o. female. Chief Complaint   Patient presents with    Annual Exam     yearly, review labs       Back pain much better and doing her exercises- feels depression is fairly well controlled but difficult w divorce-seeing therapist regularly- Pt taking thyroid meds reliably every am fasting-having some trouble getting up in the middle of the night and not getting back to sleep-has had very few palpitations -to fly to [place] to see aunt and uncle in the spring and needs some ativan for the flight       Marah Lopez  1960    No Known Allergies  Current Outpatient Medications   Medication Sig Dispense Refill    LORazepam (ATIVAN) 0.5 MG tablet Take 1 tablet by mouth every 8 hours as needed for Anxiety for up to 5 days. 10 tablet 0    citalopram (CELEXA) 20 MG tablet TAKE 1 TABLET BY MOUTH EVERY NIGHT AT BEDTIME 90 tablet 3    diclofenac (VOLTAREN) 75 MG EC tablet Take 1 tablet by mouth 2 times daily Always with food 60 tablet 3    liothyronine (CYTOMEL) 5 MCG tablet TAKE 2 TABLETS BY MOUTH DAILY 60 tablet 3    levothyroxine (SYNTHROID) 50 MCG tablet TAKE 1 TABLET BY MOUTH DAILY 30 tablet 3    nadolol (CORGARD) 20 MG tablet TAKE 1 TABLET BY MOUTH EVERY NIGHT 90 tablet 0    norethindrone-ethinyl estradiol (JINTELI) 1-5 MG-MCG TABS per tablet Take 1 tablet by mouth daily       No current facility-administered medications for this visit. Vitals:    01/29/20 0957   BP: 102/68   Weight: 177 lb (80.3 kg)   Height: 5' 7\" (1.702 m)     Body mass index is 27.72 kg/m².      Wt Readings from Last 3 Encounters:   01/29/20 177 lb (80.3 kg)   10/07/19 165 lb (74.8 kg)   08/09/19 182 lb (82.6 kg)     BP Readings from Last 3 Encounters:   01/29/20 102/68   10/07/19 110/80   08/09/19 104/62         Immunization History   Administered Date(s) Administered    Flu 18 Yrs Intradermal, Preservative Free 10/20/2014    Influenza Vaccine, unspecified formulation 10/21/2016    Influenza Virus Vaccine 10/30/2018, 10/29/2019    Tdap (Boostrix, Adacel) 01/24/2013, 01/05/2018       Past Medical History:   Diagnosis Date    Bilateral closed proximal tibial stress fracture 8/6/2015    MRI dated 7/28/2015 shows a nondisplaced fracture of the medial proximal tibial metaphysis of both knees     Foot pain 10/13/2010    Pain in posterior tibial tendon- Dr. Bell [facility]     Hypothyroidism     Menopausal state age 48    MVP (mitral valve prolapse)     last echo 9/2006 showed MVP otherwise nl-no regurg or stenosis of MV    Nondisplaced fracture of fifth metatarsal bone, left foot, initial encounter for closed fracture 1/11/2019 5/2018 in [facility]  In [address] [place] [address] for 10 weeks     Osteopenia 2010    Stress fracture of tibia 9/28/2017    Bilateral 2016-followed by Dr. Jovon Whitt Bilateral stress fractures!  Tear of medial meniscus of right knee 7/21/2015    Tendonitis, tibialis 10/13/2010     Past Surgical History:   Procedure Laterality Date    BREAST ENHANCEMENT SURGERY Bilateral     91, 00, 04    COLONOSCOPY  2013    adenomatous polyp 2-13 for 5 yr marva    COLONOSCOPY  2/26/18  repeat x 5  yrs.     DILATION AND CURETTAGE OF UTERUS       Family History   Problem Relation Age of Onset    Thyroid Disease Mother     High Cholesterol Mother     Hypertension Mother     Other Father         Atherosclerotic Disease; Spastic paraparesis of legs     No Known Problems Brother     No Known Problems Son     No Known Problems Daughter      Social History     Socioeconomic History    Marital status:      Spouse name: Not on file    Number of children: Not on file    Years of education: Not on file    Highest education level: Not on file   Occupational History    Not on file   Social Needs    Financial resource strain: Not on file    Food insecurity:     Worry: Not on file     Inability: Not on file    Transportation needs:     Medical: Not on file

## 2020-01-29 NOTE — PATIENT INSTRUCTIONS
Use aquaphor when you can and put over the counter hydrocortisone ointment on the itchy patches     Get benefiber or metamucil  and start w 1 tsp in water daily-increase by 1 tsp every week up to 2 tbsp twice daily until effective to reverse constipation and for healthy bowels and to prevent polyp formation     Check with your insurance about the new Shingrix vaccine for shingles to see where it would be cheaper to get it-either our office or your pharmacy - you will want to get on a waiting list at the pharmacy your insurance suggests  Remember not to get it before you have something fun planned!  You may not feel well  for a day or 2 after the injection  It is a series of 2 shots at least 1 month apart     Try \"calm\" at [employer] -powder you add to water for sleep  Melatonin,valerian  LO calm-source naturals   Natrol 5-HTP    Try to cut corgard in 1/2 and if you don't notice increased palpitations in 2-3 weeks then stop it

## 2020-01-31 RX ORDER — LEVOTHYROXINE SODIUM 0.05 MG/1
TABLET ORAL
Qty: 30 TABLET | Refills: 3 | Status: SHIPPED | OUTPATIENT
Start: 2020-01-31 | End: 2020-02-10

## 2020-01-31 RX ORDER — LIOTHYRONINE SODIUM 5 UG/1
TABLET ORAL
Qty: 60 TABLET | Refills: 3 | Status: SHIPPED | OUTPATIENT
Start: 2020-01-31 | End: 2020-02-10

## 2020-01-31 RX ORDER — NADOLOL 20 MG/1
TABLET ORAL
Qty: 90 TABLET | Refills: 2 | Status: SHIPPED | OUTPATIENT
Start: 2020-01-31 | End: 2020-05-08

## 2020-01-31 NOTE — TELEPHONE ENCOUNTER
LOV: 8/9/19  NOV: 2/10/20        Dose: 1 tablet QD  Strength: 50 mcg  Route: Oral         Dose: 2 tablets QD  Strength: 5 mcg  Route: Oral

## 2020-02-10 ENCOUNTER — OFFICE VISIT (OUTPATIENT)
Dept: ENDOCRINOLOGY | Age: 60
End: 2020-02-10
Payer: COMMERCIAL

## 2020-02-10 VITALS
SYSTOLIC BLOOD PRESSURE: 131 MMHG | DIASTOLIC BLOOD PRESSURE: 68 MMHG | WEIGHT: 180.6 LBS | HEART RATE: 58 BPM | OXYGEN SATURATION: 97 % | HEIGHT: 67 IN | BODY MASS INDEX: 28.35 KG/M2

## 2020-02-10 PROBLEM — R73.01 IFG (IMPAIRED FASTING GLUCOSE): Status: ACTIVE | Noted: 2020-02-10

## 2020-02-10 PROCEDURE — 99214 OFFICE O/P EST MOD 30 MIN: CPT | Performed by: INTERNAL MEDICINE

## 2020-02-10 RX ORDER — LEVOTHYROXINE SODIUM 0.05 MG/1
TABLET ORAL
Qty: 30 TABLET | Refills: 5 | Status: SHIPPED | OUTPATIENT
Start: 2020-02-10 | End: 2021-02-12 | Stop reason: SDUPTHER

## 2020-02-10 RX ORDER — LIOTHYRONINE SODIUM 5 UG/1
TABLET ORAL
Qty: 60 TABLET | Refills: 5 | Status: SHIPPED | OUTPATIENT
Start: 2020-02-10 | End: 2021-02-12 | Stop reason: SDUPTHER

## 2020-02-10 NOTE — PROGRESS NOTES
2-13 for 7 yr marva    COLONOSCOPY  2/26/18  repeat x 5  yrs.     DILATION AND CURETTAGE OF UTERUS       Family History   Problem Relation Age of Onset    Thyroid Disease Mother     High Cholesterol Mother     Hypertension Mother     Other Father         Atherosclerotic Disease; Spastic paraparesis of legs     No Known Problems Brother     No Known Problems Son     No Known Problems Daughter      Social History     Socioeconomic History    Marital status:      Spouse name: None    Number of children: None    Years of education: None    Highest education level: None   Occupational History    None   Social Needs    Financial resource strain: None    Food insecurity:     Worry: None     Inability: None    Transportation needs:     Medical: None     Non-medical: None   Tobacco Use    Smoking status: Never Smoker    Smokeless tobacco: Never Used   Substance and Sexual Activity    Alcohol use: Yes     Comment: social    Drug use: No    Sexual activity: Yes     Partners: Male   Lifestyle    Physical activity:     Days per week: None     Minutes per session: None    Stress: None   Relationships    Social connections:     Talks on phone: None     Gets together: None     Attends Roman Catholic service: None     Active member of club or organization: None     Attends meetings of clubs or organizations: None     Relationship status: None    Intimate partner violence:     Fear of current or ex partner: None     Emotionally abused: None     Physically abused: None     Forced sexual activity: None   Other Topics Concern    None   Social History Narrative    None     Current Outpatient Medications   Medication Sig Dispense Refill    liothyronine (CYTOMEL) 5 MCG tablet TAKE 2 TABLETS BY MOUTH DAILY 60 tablet 3    levothyroxine (SYNTHROID) 50 MCG tablet TAKE 1 TABLET BY MOUTH DAILY 30 tablet 3    nadolol (CORGARD) 20 MG tablet TAKE 1 TABLET BY MOUTH EVERY NIGHT (Patient taking differently: 10 mg ) 90 numbness, no tingling, no weakness, no confusion, no headaches, no dizziness, no fainting, no tremors, no decrease in memory, no balance problems  Psychiatric: has anxiety, has depression, has insomnia  Hematologic/Lymphatic: no tendency for easy bleeding, no swollen lymph nodes, no tendency for easy bruising  Immunology: has seasonal allergies, no frequent infections, no frequent illnesses  Endocrine: no temperature intolerance, has hot flashes    /68 (Site: Left Upper Arm, Position: Sitting, Cuff Size: Medium Adult)   Pulse 58   Ht 5' 7\" (1.702 m)   Wt 180 lb 9.6 oz (81.9 kg)   SpO2 97%   BMI 28.29 kg/m²    Wt Readings from Last 3 Encounters:   02/10/20 180 lb 9.6 oz (81.9 kg)   01/29/20 177 lb (80.3 kg)   10/07/19 165 lb (74.8 kg)     Body mass index is 28.29 kg/m².     OBJECTIVE:  Constitutional: no acute distress, well appearing and well nourished  Psychiatric: oriented to person, place and time, judgement and insight and normal, recent and remote memory and intact and mood and affect are normal  Skin: skin and subcutaneous tissue is normal without mass, normal turgor  Head and Face: examination of head and face revealed no abnormalities  Eyes: no lid or conjunctival swelling, erythema or discharge, pupils are normal, equal, round, reactive to light  Ears/Nose: external inspection of ears and nose revealed no abnormalities, hearing is grossly normal  Oropharynx/Mouth/Face: lips, tongue and gums are normal with no lesions, the voice quality was normal  Neck: neck is supple and symmetric, with midline trachea and no masses, thyroid is normal  Lymphatics: normal cervical lymph nodes, normal supraclavicular nodes  Pulmonary: no increased work of breathing or signs of respiratory distress, lungs are clear to auscultation  Cardiovascular: normal heart rate and rhythm, normal S1 and S2, no murmurs and pedal pulses and 2+ bilaterally, No edema  Abdomen: abdomen is soft, non-tender with no

## 2020-05-08 RX ORDER — NADOLOL 20 MG/1
20 TABLET ORAL DAILY
Qty: 90 TABLET | Refills: 2
Start: 2020-05-08 | End: 2021-01-08 | Stop reason: SDUPTHER

## 2020-06-22 ENCOUNTER — HOSPITAL ENCOUNTER (OUTPATIENT)
Dept: MAMMOGRAPHY | Age: 60
Discharge: HOME | End: 2020-06-22
Payer: COMMERCIAL

## 2020-06-22 PROCEDURE — 77063 BREAST TOMOSYNTHESIS BI: CPT

## 2020-07-02 LAB
CANDIDA SPECIES, DNA PROBE: ABNORMAL
GARDNERELLA VAGINALIS, DNA PROBE: ABNORMAL
TRICHOMONAS VAGINALIS DNA: ABNORMAL

## 2020-08-03 DIAGNOSIS — E55.9 VITAMIN D DEFICIENCY: ICD-10-CM

## 2020-08-03 DIAGNOSIS — M85.80 OSTEOPENIA, UNSPECIFIED LOCATION: ICD-10-CM

## 2020-08-03 DIAGNOSIS — R73.01 IFG (IMPAIRED FASTING GLUCOSE): ICD-10-CM

## 2020-08-03 DIAGNOSIS — E03.9 ACQUIRED HYPOTHYROIDISM: ICD-10-CM

## 2020-08-03 LAB
A/G RATIO: 1.8 (ref 1.1–2.2)
ALBUMIN SERPL-MCNC: 4.1 G/DL (ref 3.4–5)
ALP BLD-CCNC: 71 U/L (ref 40–129)
ALT SERPL-CCNC: 11 U/L (ref 10–40)
ANION GAP SERPL CALCULATED.3IONS-SCNC: 14 MMOL/L (ref 3–16)
AST SERPL-CCNC: 16 U/L (ref 15–37)
BILIRUB SERPL-MCNC: 0.4 MG/DL (ref 0–1)
BUN BLDV-MCNC: 12 MG/DL (ref 7–20)
CALCIUM SERPL-MCNC: 8.8 MG/DL (ref 8.3–10.6)
CHLORIDE BLD-SCNC: 108 MMOL/L (ref 99–110)
CO2: 23 MMOL/L (ref 21–32)
CREAT SERPL-MCNC: 0.9 MG/DL (ref 0.6–1.1)
GFR AFRICAN AMERICAN: >60
GFR NON-AFRICAN AMERICAN: >60
GLOBULIN: 2.3 G/DL
GLUCOSE BLD-MCNC: 81 MG/DL (ref 70–99)
POTASSIUM SERPL-SCNC: 4.4 MMOL/L (ref 3.5–5.1)
SODIUM BLD-SCNC: 145 MMOL/L (ref 136–145)
T3 FREE: 4.3 PG/ML (ref 2.3–4.2)
T4 FREE: 1.2 NG/DL (ref 0.9–1.8)
TOTAL PROTEIN: 6.4 G/DL (ref 6.4–8.2)
TSH SERPL DL<=0.05 MIU/L-ACNC: 2.12 UIU/ML (ref 0.27–4.2)
VITAMIN D 25-HYDROXY: 40.4 NG/ML

## 2020-08-04 LAB
ESTIMATED AVERAGE GLUCOSE: 96.8 MG/DL
HBA1C MFR BLD: 5 %

## 2020-08-12 ENCOUNTER — OFFICE VISIT (OUTPATIENT)
Dept: ENDOCRINOLOGY | Age: 60
End: 2020-08-12
Payer: COMMERCIAL

## 2020-08-12 VITALS
HEART RATE: 50 BPM | OXYGEN SATURATION: 99 % | RESPIRATION RATE: 14 BRPM | WEIGHT: 177.6 LBS | SYSTOLIC BLOOD PRESSURE: 123 MMHG | BODY MASS INDEX: 27.88 KG/M2 | TEMPERATURE: 98.6 F | HEIGHT: 67 IN | DIASTOLIC BLOOD PRESSURE: 63 MMHG

## 2020-08-12 PROCEDURE — 99214 OFFICE O/P EST MOD 30 MIN: CPT | Performed by: INTERNAL MEDICINE

## 2020-08-12 RX ORDER — 1.1% SODIUM FLUORIDE 11 MG/G
GEL DENTAL
COMMUNITY
Start: 2020-08-06 | End: 2022-04-25

## 2020-08-12 NOTE — PROGRESS NOTES
SUBJECTIVE:  Young Tobin is a 64 y.o. female who is here for hypothyroidism. 1. Acquired hypothyroidism    This started in 2010. Patient was diagnosed with hypothyroidism. The problem has been unchanged. Patient started medication in 2015. Currently patient is on: levothyroxine, liothyronide. Misses  0 doses a month. Current complaints:  fatigue  Has a lot of stress at work. History of obstructive symptoms: difficulty swallowing No, changes in voice/hoarseness No.  History of radiation to patient's neck: No  Resent iodine exposure: No  Family history includes hypothyroidism, goiter  Family history of thyroid cancer: No    2. Menopause  Periods stopped at age 50. No hot flashes on estrogen. 3. Osteopenia, unspecified location  Had stress fractures in tibula. Healed recent 5th metatarsal left leg fracture. No bone pain. 4.  Vitamin D deficiency  No bone pain. 5. IFG   Has family Hx of diabetes    BONE DENSITOMETRY (Dexa)        HISTORY: Postmenopausal estrogen deficiency            Impression   IMPRESSION:        Normal mineralization of the lumbar spine with T. evaluate -0.9.    There is mild osteopenia of the L4 segment with T. evaluate -1.3       Osteopenia of the total hip and femoral neck with T. diagnosis of   -1.2 and -1.6, respectively       Comparison with 2010 shows 5.4% increase in bone mineral density   of the spine and 2.5% increase in bone mineral density of the hip       Frax index indicates a 5.9% risk of major osteoporotic fracture   over the next 10 years and 0.5% risk of hip fracture over the same   time frame       The T-value compares the patient's bone mineral density with the   peak bone mass of young normal patient's (average BMD of young   age, sex and race matched controls).  Accor ding to the WHO (World   Health Organization) criteria, patients with T-values between -1   and -2.5 have a low bone mass or density (osteopenia).  Patients   with T-values less than Yes -2.5 have osteoporosis.  The Z-value   compares the patient's bone mineral density with age and   sex-matched peers (average BMD of same age, sex and race matched   controls).          A T score below -2.5, especially in the presence of risk factors   in post menopausal women, indicates the need for treatment to   prevent fractures.  A T score below -1 within five years after   menopause or a Z score below -1 at any age indicates the need to   prevent further bone loss.  A Z score below -2 indicates   accelerated bone loss and suggests further studies to identify   possible major risk factors (2).          The risk of vertebral or hip fracture approximately doubles   (1.5-2.5) for each decrease of 1 standard deviation in T-score   (2).  Low bone density is not the only risk factor for fracture. Other risk factors are patient age, risk of falling, previous   osteoporotic fracture and family history of osteoporosis.          Serial examinations of bone mineral density are most cost   effective when performed every 12-24 months because of the   relatively slow rate of bone resorption and remodeling.  The   change in a patient's bone mineral density between the two exams   should be at least 5% to be meaningful (4.2-5.5% for the lumbar   spine, 5.5-8.5% for the hip)  (3).          References:        (1) International Society for Clinical Densitometry 2007. (2) [facility], [address] [place] [address]; [id number].     (3) TAYE Morrison  [employer]  8957; 62: 207-214.               EXAMINATION:   THYROID ULTRASOUND       7/23/2018       COMPARISON:   None.       HISTORY:   ORDERING PHYSICIAN PROVIDED HISTORY: Goiter   TECHNOLOGIST PROVIDED HISTORY:   Technologist Provided Reason for Exam: nodule   Acuity: Unknown   Type of Encounter: Unknown       FINDINGS:   Right thyroid lobe:  3.6 x 1.1 x 1.0 cm       Left thyroid lobe:  3.6 x 1.3 x 1.2 cm       Isthmus:  2 mm       Thyroid Gland:  Thyroid gland demonstrates normal echotexture and vascularity.       Nodules: No thyroid nodules are present.       Cervical lymphadenopathy: No abnormal lymph nodes in the imaged portions of   the neck.           Impression   Unremarkable thyroid ultrasound. Past Medical History:   Diagnosis Date    Bilateral closed proximal tibial stress fracture 8/6/2015    MRI dated 7/28/2015 shows a nondisplaced fracture of the medial proximal tibial metaphysis of both knees     Foot pain 10/13/2010    Pain in posterior tibial tendon- Dr. Michelle Miller podiatry     Hypothyroidism     Menopausal state age [place]    MVP (mitral valve prolapse)     last echo 9/2006 showed MVP otherwise nl-no regurg or stenosis of MV    Nondisplaced fracture of fifth metatarsal bone, left foot, initial encounter for closed fracture 1/11/2019 5/2018 in [facility]  [facility] for 10 weeks     Osteopenia 2010    Stress fracture of tibia 9/28/2017    Bilateral 2016-followed by Dr. Kulkarni Drafts Bilateral stress fractures!     Tear of medial meniscus of right knee 7/21/2015    Tendonitis, tibialis 10/13/2010     Patient Active Problem List    Diagnosis Date Noted    IFG (impaired fasting glucose) 02/10/2020    Low back pain 10/08/2019    Vitamin D deficiency 08/08/2019    Adenomatous polyp 10/10/2017    Constipation 12/18/2015    Hypothyroidism     Osteopenia     Chondromalacia patellae of left knee 07/21/2015    Bilateral knee pain 07/14/2015    Fatigue 04/27/2015    Visual disturbance 03/29/2013    Ocular muscular dystrophy ([facility] [address] 03/29/2013    Headache 03/29/2013    Insomnia 11/07/2011    Anxiety 11/07/2011    PAC (premature atrial contraction) 01/09/2011    Family history of diabetes mellitus 10/13/2010    Hypertension 10/13/2010    Mitral valve prolapse 10/13/2010    Menopause 10/13/2010     Past Surgical History:   Procedure Laterality Date    BREAST ENHANCEMENT SURGERY Bilateral     91, 00, 04    COLONOSCOPY  2013    adenomatous polyp 2-13 for 5 yr marva    tablet daily 30 tablet 5    citalopram (CELEXA) 20 MG tablet TAKE 1 TABLET BY MOUTH EVERY NIGHT AT BEDTIME 90 tablet 3    diclofenac (VOLTAREN) 75 MG EC tablet Take 1 tablet by mouth 2 times daily Always with food 60 tablet 3    norethindrone-ethinyl estradiol (JINTELI) 1-5 MG-MCG TABS per tablet Take 1 tablet by mouth daily       No current facility-administered medications for this visit. No Known Allergies  Family Status   Relation Name Status    Mother  Alive        HTN,chronic renal disease,xsmoker    Father          HTN-paraplegic after fastpitch injury age 13!     PGM          ASCAD/pancreatic ca    MGM          DM    PGF          alzheimers    MGF      Brother  Alive    Son  Alive    Ricardo  Alive       Review of Systems:  Constitutional: has fatigue, no fever, no recent weight gain, no recent weight loss, no changes in appetite, hard to lose weight  Eyes: no eye pain, has change in vision, no eye redness, has eye irritation, no double vision  Ears, nose, throat: no nasal congestion, no sore throat, no earache, no decrease in hearing, no hoarseness, no dry mouth, no sinus problems, no difficulty swallowing, no neck lumps, no dental problems, no mouth sores, no ringing in ears  Pulmonary: no shortness of breath, no wheezing, no dyspnea on exertion, no cough  Cardiovascular: no chest pain, no lower extremity edema, no orthopnea, no intermittent leg claudication, has palpitations  Gastrointestinal: no abdominal pain, no nausea, no vomiting, no diarrhea, no constipation, no dysphagia, no heartburn, no bloating  Genitourinary: no dysuria, no urinary incontinence, no urinary hesitancy, no urinary frequency, no feelings of urinary urgency, has nocturia  Musculoskeletal: no joint swelling, no joint stiffness, has joint pain, no muscle cramps, no muscle pain, no bone pain  Integument/Breast: no hair loss, no skin rashes, no skin lesions, no itching, no dry skin  Neurological: no numbness, no tingling, no weakness, no confusion, no headaches, no dizziness, no fainting, no tremors, no decrease in memory, no balance problems  Psychiatric: has anxiety, has depression, has insomnia  Hematologic/Lymphatic: no tendency for easy bleeding, no swollen lymph nodes, no tendency for easy bruising  Immunology: has seasonal allergies, no frequent infections, no frequent illnesses  Endocrine: no temperature intolerance, has hot flashes    /63 (Site: Left Upper Arm, Position: Sitting, Cuff Size: Medium Adult)   Pulse 50   Temp 98.6 °F (37 °C)   Resp 14   Ht 5' 7\" (1.702 m)   Wt 177 lb 9.6 oz (80.6 kg)   SpO2 99%   BMI 27.82 kg/m²    Wt Readings from Last 3 Encounters:   08/12/20 177 lb 9.6 oz (80.6 kg)   02/10/20 180 lb 9.6 oz (81.9 kg)   01/29/20 177 lb (80.3 kg)     Body mass index is 27.82 kg/m².     OBJECTIVE:  Constitutional: no acute distress, well appearing and well nourished  Psychiatric: oriented to person, place and time, judgement and insight and normal, recent and remote memory and intact and mood and affect are normal  Skin: skin and subcutaneous tissue is normal without mass, normal turgor  Head and Face: examination of head and face revealed no abnormalities  Eyes: no lid or conjunctival swelling, erythema or discharge, pupils are normal, equal, round, reactive to light  Ears/Nose: external inspection of ears and nose revealed no abnormalities, hearing is grossly normal  Oropharynx/Mouth/Face: lips, tongue and gums are normal with no lesions, the voice quality was normal  Neck: neck is supple and symmetric, with midline trachea and no masses, thyroid is normal  Lymphatics: normal cervical lymph nodes, normal supraclavicular nodes  Pulmonary: no increased work of breathing or signs of respiratory distress, lungs are clear to auscultation  Cardiovascular: normal heart rate and rhythm, normal S1 and S2, no murmurs and pedal pulses and 2+ bilaterally, No edema  Abdomen: abdomen is soft, non-tender with no masses  Musculoskeletal: normal gait and station and exam of the digits and nails are normal  Neurological: normal coordination and normal general cortical function      Lab Review:    Lab Results   Component Value Date    WBC 5.2 01/11/2020    HGB 14.5 01/11/2020    HCT 43.2 01/11/2020    MCV 97.2 01/11/2020     01/11/2020     Lab Results   Component Value Date     08/03/2020    K 4.4 08/03/2020     08/03/2020    CO2 23 08/03/2020    BUN 12 08/03/2020    CREATININE 0.9 08/03/2020    GLUCOSE 81 08/03/2020    GLUCOSE 76 12/14/2011    CALCIUM 8.8 08/03/2020    PROT 6.4 08/03/2020    PROT 6.9 02/22/2013    LABALBU 4.1 08/03/2020    BILITOT 0.4 08/03/2020    ALKPHOS 71 08/03/2020    AST 16 08/03/2020    ALT 11 08/03/2020    LABGLOM >60 08/03/2020    LABGLOM 64 10/24/2013    GFRAA >60 08/03/2020    AGRATIO 1.8 08/03/2020    GLOB 2.3 08/03/2020     Lab Results   Component Value Date    TSH 2.12 08/03/2020    FT3 4.3 08/03/2020     Lab Results   Component Value Date    LABA1C 5.0 08/03/2020     Lab Results   Component Value Date    EAG 96.8 08/03/2020     Lab Results   Component Value Date    CHOL 135 01/11/2020     Lab Results   Component Value Date    TRIG 74 01/11/2020     Lab Results   Component Value Date    HDL 54 01/11/2020     Lab Results   Component Value Date    LDLCALC 66 01/11/2020     Lab Results   Component Value Date    LABVLDL 15 01/11/2020     Lab Results   Component Value Date    CHOLHDLRATIO 2.7 10/24/2013     Lab Results   Component Value Date    LABMICR <0.2 11/19/2010     Lab Results   Component Value Date    VITD25 40.4 08/03/2020        ASSESSMENT/PLAN:  1. Acquired hypothyroidism  TSH 2.5-1.4-2.1  Levothyroxine 0.05 mg qd, liothyronine 5 mcg 2 tabs daily.  - TSH without Reflex; Future  - T4, Free; Future  - T3, Free; Future  - Comprehensive Metabolic Panel; Future  - T3, Reverse; Future    2.  Menopause  Calcium, vitamin D  - DEXA Bone Density Axial Skeleton; Future    3. Osteopenia, unspecified location  Calcium, vitamin D  - DEXA Bone Density Axial Skeleton; Future    4. Vitamin D deficiency  25 hydroxy vitamin D 29-37- 40.4  Vitamin D supplement 2000 IU qd. Bone no bone pain  New problem    5. IFG   Glucose 101-81  Hemoglobin A1c 5.0    Reviewed and/or ordered clinical lab results Yes  Reviewed and/or ordered radiology tests Yes   Reviewed and/or ordered other diagnostic tests No  Discussed test results with performing physician No  Independently reviewed image, tracing, or specimen No  Made a decision to obtain old records No  Reviewed and summarized old records Yes  Obtained history from other than patient No    Thiago Angelita was counseled regarding symptoms of thyroid, osteopenia, vitamin D deficiency diagnosis, course and complications of disease if inadequately treated, side effects of medications, diagnosis, treatment options, and prognosis, risks, benefits, complications, and alternatives of treatment, labs, imaging and other studies and treatment targets and goals. She understands instructions and counseling. Return in about 6 months (around 2/12/2021) for thyroid problems.

## 2020-10-23 ENCOUNTER — HOSPITAL ENCOUNTER (OUTPATIENT)
Dept: GENERAL RADIOLOGY | Age: 60
Discharge: HOME | End: 2020-10-23
Payer: COMMERCIAL

## 2020-10-23 PROCEDURE — 77080 DXA BONE DENSITY AXIAL: CPT

## 2020-11-09 ENCOUNTER — TELEPHONE (OUTPATIENT)
Dept: INTERNAL MEDICINE CLINIC | Age: 60
End: 2020-11-09

## 2020-11-09 NOTE — TELEPHONE ENCOUNTER
Patient called and asked if you could give her a referral t an orthopaedic doctor, she is having pain and sometimes clicking in her left clavicle area.      Please call to advise

## 2020-11-11 ENCOUNTER — OFFICE VISIT (OUTPATIENT)
Dept: INTERNAL MEDICINE CLINIC | Age: 60
End: 2020-11-11
Payer: COMMERCIAL

## 2020-11-11 VITALS
WEIGHT: 177 LBS | DIASTOLIC BLOOD PRESSURE: 80 MMHG | HEIGHT: 67 IN | TEMPERATURE: 97.3 F | BODY MASS INDEX: 27.78 KG/M2 | SYSTOLIC BLOOD PRESSURE: 114 MMHG

## 2020-11-11 PROBLEM — M89.8X1 PAIN OF LEFT CLAVICLE: Status: ACTIVE | Noted: 2020-11-11

## 2020-11-11 PROCEDURE — 99214 OFFICE O/P EST MOD 30 MIN: CPT | Performed by: INTERNAL MEDICINE

## 2020-11-11 ASSESSMENT — PATIENT HEALTH QUESTIONNAIRE - PHQ9
SUM OF ALL RESPONSES TO PHQ QUESTIONS 1-9: 0
1. LITTLE INTEREST OR PLEASURE IN DOING THINGS: 0
SUM OF ALL RESPONSES TO PHQ QUESTIONS 1-9: 0
SUM OF ALL RESPONSES TO PHQ QUESTIONS 1-9: 0
SUM OF ALL RESPONSES TO PHQ9 QUESTIONS 1 & 2: 0
2. FEELING DOWN, DEPRESSED OR HOPELESS: 0

## 2020-11-11 ASSESSMENT — ENCOUNTER SYMPTOMS
ABDOMINAL PAIN: 0
BACK PAIN: 0
CHEST TIGHTNESS: 0
WHEEZING: 0
COLOR CHANGE: 0

## 2020-11-11 NOTE — PATIENT INSTRUCTIONS
exercises  Neck stretches to the side   1. This stretch works best if you keep your shoulder down as you lean away from it. To help you remember to do this, start by relaxing your shoulders and lightly holding on to your thighs or your chair. 2. Tilt your head toward your shoulder and hold for 15 to 30 seconds. Let the weight of your head stretch your muscles. 3. Repeat 2 to 4 times toward each shoulder. Chin tuck   1. Lie on the floor with a rolled-up towel under your neck. Your head should be touching the floor. 2. Slowly bring your chin toward your chest.  3. Hold for a count of 6, and then relax for up to 10 seconds. 4. Repeat 8 to 12 times. Active cervical rotation   1. Sit in a firm chair, or stand up straight. 2. Keeping your chin level, turn your head to the right, and hold for 15 to 30 seconds. 3. Turn your head to the left and hold for 15 to 30 seconds. 4. Repeat 2 to 4 times to each side. Shoulder blade squeeze   1. While standing, squeeze your shoulder blades together. 2. Do not raise your shoulders up as you are squeezing. 3. Hold for 6 seconds. 4. Repeat 8 to 12 times. Shoulder rolls   1. Sit comfortably with your feet shoulder-width apart. You can also do this exercise standing up. 2. Roll your shoulders up, then back, and then down in a smooth, circular motion. 3. Repeat 2 to 4 times. Follow-up care is a key part of your treatment and safety. Be sure to make and go to all appointments, and call your doctor if you are having problems. It's also a good idea to know your test results and keep a list of the medicines you take. Where can you learn more? Go to [email or web address] and sign in to your [employer] account. Enter E007 in the [employer] box to learn more about \"Neck Arthritis: Exercises. \"     If you do not have an account, please click on the \"Sign Up Now\" link.   Current as of: March 2, 2020               Content Version: 12.6  © 2043-6263 Healthwise, Incorporated. Care instructions adapted under license by [facility]. If you have questions about a medical condition or this instruction, always ask your healthcare professional. [address] any warranty or liability for your use of this information.

## 2020-11-11 NOTE — PROGRESS NOTES
Subjective:      Patient ID: Denisse Cook is a 64 y.o. female. Chief Complaint   Patient presents with    Hypertension     review labs? ,(8/2020)also having painful  (L) SIDE COLLAR BONE x 1 mo. ,used tylenol/diclofenac     Having left collarbone pain- ?? Related to the way she is sleeping- no injury- pain gets worse throughout the day-no radiation into arm- doing so much better spiritually and emotionally as divorce will finally be settled- celexa helps w underlying depression -bp's have been well controlled- sleeping better also -utd w thyroid checks -no recent palpitations     Review of Systems   Constitutional: Negative for activity change, appetite change, fatigue and fever. HENT: Negative for congestion. Eyes: Negative for visual disturbance. Respiratory: Negative for chest tightness and wheezing. Cardiovascular: Negative for chest pain and palpitations. Gastrointestinal: Negative for abdominal pain. Musculoskeletal: Positive for arthralgias and myalgias. Negative for back pain. Skin: Negative for color change and rash. Neurological: Negative for weakness, light-headedness and headaches. Psychiatric/Behavioral: Negative for dysphoric mood. The patient is not nervous/anxious.         Family History   Problem Relation Age of Onset    Thyroid Disease Mother     High Cholesterol Mother     Hypertension Mother     Other Father         Atherosclerotic Disease; Spastic paraparesis of legs     No Known Problems Brother     No Known Problems Son     No Known Problems Daughter      Social History     Socioeconomic History    Marital status: Legally      Spouse name: Not on file    Number of children: Not on file    Years of education: Not on file    Highest education level: Not on file   Occupational History    Not on file   Social Needs    Financial resource strain: Not on file    Food insecurity     Worry: Not on file     Inability: Not on file   [employer] needs Medical: Not on file     Non-medical: Not on file   Tobacco Use    Smoking status: Never Smoker    Smokeless tobacco: Never Used   Substance and Sexual Activity    Alcohol use: Yes     Comment: social    Drug use: No    Sexual activity: Yes     Partners: Male   Lifestyle    Physical activity     Days per week: Not on file     Minutes per session: Not on file    Stress: Not on file   Relationships    Social connections     Talks on phone: Not on file     Gets together: Not on file     Attends Jainism service: Not on file     Active member of club or organization: Not on file     Attends meetings of clubs or organizations: Not on file     Relationship status: Not on file    Intimate partner violence     Fear of current or ex partner: Not on file     Emotionally abused: Not on file     Physically abused: Not on file     Forced sexual activity: Not on file   Other Topics Concern    Not on file   Social History Narrative    Not on file         Objective:   Physical Exam  Constitutional:       Appearance: She is well-developed. HENT:      Head: Normocephalic and atraumatic. Eyes:      Conjunctiva/sclera: Conjunctivae normal.      Pupils: Pupils are equal, round, and reactive to light. Neck:      Musculoskeletal: Normal range of motion and neck supple. Cardiovascular:      Rate and Rhythm: Normal rate and regular rhythm. Heart sounds: Normal heart sounds. Pulmonary:      Effort: Pulmonary effort is normal. No respiratory distress. Breath sounds: Normal breath sounds. Abdominal:      General: There is no distension. Tenderness: There is no abdominal tenderness. Musculoskeletal:         General: Swelling and tenderness present. Comments: Severe spasm of upper back on left- pt tenderness along clavicle and top of shoulder which is hiked up significantly in comparison to right    Lymphadenopathy:      Cervical: No cervical adenopathy. Skin:     General: Skin is warm and dry. Neurological:      Mental Status: She is alert and oriented to person, place, and time. Psychiatric:         Behavior: Behavior normal.           Assessment and Plan:      Hypertension  Controlled w current regimen- continue and monitor closely   still low but asymptomatic     Anxiety  Much better control w divorce almost finalized- cont celexa for now     Hypothyroidism  Cont meds and f/u w endo     Pain of left clavicle  Due likely to severe muscle spasm in upper back and neck- very out of alignment-to do simple exercises,cont pilates and do physical therapy if not improving in next few weeks        Encounter Diagnoses   Name Primary?     Acquired hypothyroidism Yes    Pure hypercholesterolemia     Clavicle pain     Essential hypertension     Anxiety     Pain of left clavicle        Orders Placed This Encounter   Procedures    Lipid Panel     Standing Status:   Future     Standing Expiration Date:   11/11/2021     Order Specific Question:   Is Patient Fasting?/# of Hours     Answer:   yes/10    CBC Auto Differential     Standing Status:   Future     Standing Expiration Date:   11/11/2021    External Referral To Physical Therapy     Referral Priority:   Routine     Referral Type:   Eval and Treat     Referral Reason:   Specialty Services Required     Requested Specialty:   Physical Therapy     Number of Visits Requested:   1

## 2021-01-08 ENCOUNTER — TELEPHONE (OUTPATIENT)
Dept: INTERNAL MEDICINE CLINIC | Age: 61
End: 2021-01-08

## 2021-01-08 RX ORDER — NADOLOL 20 MG/1
20 TABLET ORAL DAILY
Qty: 90 TABLET | Refills: 1 | Status: SHIPPED | OUTPATIENT
Start: 2021-01-08 | End: 2021-07-09 | Stop reason: SDUPTHER

## 2021-01-29 RX ORDER — CITALOPRAM 20 MG/1
TABLET ORAL
Qty: 90 TABLET | Refills: 3 | Status: SHIPPED | OUTPATIENT
Start: 2021-01-29 | End: 2022-01-14

## 2021-02-05 DIAGNOSIS — E78.00 PURE HYPERCHOLESTEROLEMIA: ICD-10-CM

## 2021-02-05 DIAGNOSIS — M89.8X1 CLAVICLE PAIN: ICD-10-CM

## 2021-02-05 DIAGNOSIS — M85.80 OSTEOPENIA, UNSPECIFIED LOCATION: ICD-10-CM

## 2021-02-05 DIAGNOSIS — E03.9 ACQUIRED HYPOTHYROIDISM: ICD-10-CM

## 2021-02-05 DIAGNOSIS — R73.01 IFG (IMPAIRED FASTING GLUCOSE): ICD-10-CM

## 2021-02-05 DIAGNOSIS — E55.9 VITAMIN D DEFICIENCY: ICD-10-CM

## 2021-02-05 LAB
BASOPHILS ABSOLUTE: 0.1 K/UL (ref 0–0.2)
BASOPHILS RELATIVE PERCENT: 1.1 %
EOSINOPHILS ABSOLUTE: 0.1 K/UL (ref 0–0.6)
EOSINOPHILS RELATIVE PERCENT: 1.5 %
HCT VFR BLD CALC: 44.3 % (ref 36–48)
HEMOGLOBIN: 14.8 G/DL (ref 12–16)
LYMPHOCYTES ABSOLUTE: 2 K/UL (ref 1–5.1)
LYMPHOCYTES RELATIVE PERCENT: 27.5 %
MCH RBC QN AUTO: 32.9 PG (ref 26–34)
MCHC RBC AUTO-ENTMCNC: 33.3 G/DL (ref 31–36)
MCV RBC AUTO: 98.5 FL (ref 80–100)
MONOCYTES ABSOLUTE: 0.5 K/UL (ref 0–1.3)
MONOCYTES RELATIVE PERCENT: 7.6 %
NEUTROPHILS ABSOLUTE: 4.5 K/UL (ref 1.7–7.7)
NEUTROPHILS RELATIVE PERCENT: 62.3 %
PDW BLD-RTO: 12.6 % (ref 12.4–15.4)
PLATELET # BLD: 269 K/UL (ref 135–450)
PMV BLD AUTO: 9.8 FL (ref 5–10.5)
RBC # BLD: 4.49 M/UL (ref 4–5.2)
WBC # BLD: 7.2 K/UL (ref 4–11)

## 2021-02-06 LAB
A/G RATIO: 1.8 (ref 1.1–2.2)
ALBUMIN SERPL-MCNC: 4.4 G/DL (ref 3.4–5)
ALP BLD-CCNC: 65 U/L (ref 40–129)
ALT SERPL-CCNC: 15 U/L (ref 10–40)
ANION GAP SERPL CALCULATED.3IONS-SCNC: 14 MMOL/L (ref 3–16)
AST SERPL-CCNC: 20 U/L (ref 15–37)
BILIRUB SERPL-MCNC: 0.6 MG/DL (ref 0–1)
BUN BLDV-MCNC: 11 MG/DL (ref 7–20)
CALCIUM SERPL-MCNC: 9.4 MG/DL (ref 8.3–10.6)
CHLORIDE BLD-SCNC: 104 MMOL/L (ref 99–110)
CHOLESTEROL, TOTAL: 160 MG/DL (ref 0–199)
CO2: 22 MMOL/L (ref 21–32)
CREAT SERPL-MCNC: 1 MG/DL (ref 0.6–1.1)
ESTIMATED AVERAGE GLUCOSE: 88.2 MG/DL
GFR AFRICAN AMERICAN: >60
GFR NON-AFRICAN AMERICAN: 57
GLOBULIN: 2.5 G/DL
GLUCOSE BLD-MCNC: 81 MG/DL (ref 70–99)
HBA1C MFR BLD: 4.7 %
HDLC SERPL-MCNC: 59 MG/DL (ref 40–60)
LDL CHOLESTEROL CALCULATED: 82 MG/DL
POTASSIUM SERPL-SCNC: 4.6 MMOL/L (ref 3.5–5.1)
SODIUM BLD-SCNC: 140 MMOL/L (ref 136–145)
T3 FREE: 4.3 PG/ML (ref 2.3–4.2)
T4 FREE: 1.1 NG/DL (ref 0.9–1.8)
TOTAL PROTEIN: 6.9 G/DL (ref 6.4–8.2)
TRIGL SERPL-MCNC: 93 MG/DL (ref 0–150)
TSH SERPL DL<=0.05 MIU/L-ACNC: 3.11 UIU/ML (ref 0.27–4.2)
VLDLC SERPL CALC-MCNC: 19 MG/DL

## 2021-02-12 ENCOUNTER — OFFICE VISIT (OUTPATIENT)
Dept: ENDOCRINOLOGY | Age: 61
End: 2021-02-12
Payer: COMMERCIAL

## 2021-02-12 VITALS
WEIGHT: 180 LBS | SYSTOLIC BLOOD PRESSURE: 118 MMHG | HEIGHT: 67 IN | DIASTOLIC BLOOD PRESSURE: 70 MMHG | BODY MASS INDEX: 28.25 KG/M2 | HEART RATE: 52 BPM | RESPIRATION RATE: 14 BRPM

## 2021-02-12 DIAGNOSIS — Z78.0 MENOPAUSE: ICD-10-CM

## 2021-02-12 DIAGNOSIS — M85.80 OSTEOPENIA, UNSPECIFIED LOCATION: ICD-10-CM

## 2021-02-12 DIAGNOSIS — E03.9 ACQUIRED HYPOTHYROIDISM: Primary | ICD-10-CM

## 2021-02-12 DIAGNOSIS — E55.9 VITAMIN D DEFICIENCY: ICD-10-CM

## 2021-02-12 DIAGNOSIS — R73.01 IFG (IMPAIRED FASTING GLUCOSE): ICD-10-CM

## 2021-02-12 PROCEDURE — 99214 OFFICE O/P EST MOD 30 MIN: CPT | Performed by: INTERNAL MEDICINE

## 2021-02-12 RX ORDER — LEVOTHYROXINE SODIUM 0.05 MG/1
TABLET ORAL
Qty: 30 TABLET | Refills: 5 | Status: SHIPPED | OUTPATIENT
Start: 2021-02-12 | End: 2021-06-21 | Stop reason: SDUPTHER

## 2021-02-12 RX ORDER — LIOTHYRONINE SODIUM 5 UG/1
TABLET ORAL
Qty: 60 TABLET | Refills: 5 | Status: SHIPPED | OUTPATIENT
Start: 2021-02-12 | End: 2021-06-21 | Stop reason: SDUPTHER

## 2021-02-12 NOTE — PROGRESS NOTES
SUBJECTIVE:  Henna Kramer is a 64 y.o. female who is here for hypothyroidism. 1. Acquired hypothyroidism    This started in 2010. Patient was diagnosed with hypothyroidism. The problem has been unchanged. Patient started medication in 2015. Currently patient is on: levothyroxine, liothyronide. Misses  0 doses a month. Current complaints:  fatigue  Has a lot of stress at work. History of obstructive symptoms: difficulty swallowing No, changes in voice/hoarseness No.  History of radiation to patient's neck: No  Resent iodine exposure: No  Family history includes hypothyroidism, goiter  Family history of thyroid cancer: No    2. Menopause  Periods stopped at age 50. No hot flashes on estrogen. 3. Osteopenia, unspecified location  Had stress fractures in tibula. Healed recent 5th metatarsal left leg fracture. No bone pain. 4.  Vitamin D deficiency  No bone pain. 5. IFG   Has family Hx of diabetes    BONE DENSITOMETRY (Dexa)        HISTORY: Postmenopausal estrogen deficiency            Impression   IMPRESSION:        Normal mineralization of the lumbar spine with T. evaluate -0.9.    There is mild osteopenia of the L4 segment with T. evaluate -1.3       Osteopenia of the total hip and femoral neck with T. diagnosis of   -1.2 and -1.6, respectively       Comparison with 2010 shows 5.4% increase in bone mineral density   of the spine and 2.5% increase in bone mineral density of the hip       Frax index indicates a 5.9% risk of major osteoporotic fracture   over the next 10 years and 0.5% risk of hip fracture over the same   time frame       The T-value compares the patient's bone mineral density with the   peak bone mass of young normal patient's (average BMD of young   age, sex and race matched controls).  Accor ding to the WHO (World   Health Organization) criteria, patients with T-values between -1   and -2.5 have a low bone mass or density (osteopenia).  Patients   with T-values less than -2.5 have osteoporosis.  The Z-value   compares the patient's bone mineral density with age and   sex-matched peers (average BMD of same age, sex and race matched   controls).          A T score below -2.5, especially in the presence of risk factors   in post menopausal women, indicates the need for treatment to   prevent fractures.  A T score below -1 within five years after   menopause or a Z score below -1 at any age indicates the need to   prevent further bone loss.  A Z score below -2 indicates   accelerated bone loss and suggests further studies to identify   possible major risk factors (2).          The risk of vertebral or hip fracture approximately doubles   (1.5-2.5) for each decrease of 1 standard deviation in T-score   (2).  Low bone density is not the only risk factor for fracture. Other risk factors are patient age, risk of falling, previous   osteoporotic fracture and family history of osteoporosis.          Serial examinations of bone mineral density are most cost   effective when performed every 12-24 months because of the   relatively slow rate of bone resorption and remodeling.  The   change in a patient's bone mineral density between the two exams   should be at least 5% to be meaningful (4.2-5.5% for the lumbar   spine, 5.5-8.5% for the hip)  (3).          References:        (1) International Society for Clinical Densitometry 2007. (2) [facility], [address] [place] [address]; [phone].     (3) MASON Morrison.  [employer] Tissue Int  6352; 62: 207-214.               EXAMINATION:   THYROID ULTRASOUND       7/23/2018       COMPARISON:   None.       HISTORY:   ORDERING PHYSICIAN PROVIDED HISTORY: Goiter   TECHNOLOGIST PROVIDED HISTORY:   Technologist Provided Reason for Exam: nodule   Acuity: Unknown   Type of Encounter: Unknown       FINDINGS:   Right thyroid lobe:  3.6 x 1.1 x 1.0 cm       Left thyroid lobe:  3.6 x 1.3 x 1.2 cm       Isthmus:  2 mm       Thyroid Gland:  Thyroid gland demonstrates normal echotexture and vascularity.       Nodules: No thyroid nodules are present.       Cervical lymphadenopathy: No abnormal lymph nodes in the imaged portions of   the neck.           Impression   Unremarkable thyroid ultrasound. Past Medical History:   Diagnosis Date    Bilateral closed proximal tibial stress fracture 8/6/2015    MRI dated 7/28/2015 shows a nondisplaced fracture of the medial proximal tibial metaphysis of both knees     Foot pain 10/13/2010    Pain in posterior tibial tendon- Dr. Carolina Mesa podiatry     Hypothyroidism     Menopausal state age 48    MVP (mitral valve prolapse)     last echo 9/2006 showed MVP otherwise nl-no regurg or stenosis of MV    Nondisplaced fracture of fifth metatarsal bone, left foot, initial encounter for closed fracture 1/11/2019 5/2018 in [facility]  In [address] [place] [address] for 10 weeks     Osteopenia 2010    Stress fracture of tibia 9/28/2017    Bilateral 2016-followed by Dr. Andrez Bhatia Bilateral stress fractures!     Tear of medial meniscus of right knee 7/21/2015    Tendonitis, tibialis 10/13/2010     Patient Active Problem List    Diagnosis Date Noted    Pain of left clavicle 11/11/2020    IFG (impaired fasting glucose) 02/10/2020    Low back pain 10/08/2019    Vitamin D deficiency 08/08/2019    Adenomatous polyp 10/10/2017    Constipation 12/18/2015    Hypothyroidism     Osteopenia     Chondromalacia patellae of left knee 07/21/2015    Bilateral knee pain 07/14/2015    Fatigue 04/27/2015    Visual disturbance 03/29/2013    Ocular muscular dystrophy ([facility] [address] 03/29/2013    Headache 03/29/2013    Insomnia 11/07/2011    Anxiety 11/07/2011    PAC (premature atrial contraction) 01/09/2011    Family history of diabetes mellitus 10/13/2010    Hypertension 10/13/2010    Mitral valve prolapse 10/13/2010    Menopause 10/13/2010     Past Surgical History:   Procedure Laterality Date    BREAST ENHANCEMENT SURGERY Bilateral     91, 00, 04    COLONOSCOPY  2013    adenomatous polyp 2-13 for 5 yr marva    COLONOSCOPY  2/26/18  repeat x 5  yrs.     DILATION AND CURETTAGE OF UTERUS       Family History   Problem Relation Age of Onset    Thyroid Disease Mother     High Cholesterol Mother     Hypertension Mother     Other Father         Atherosclerotic Disease; Spastic paraparesis of legs     No Known Problems Brother     No Known Problems Son     No Known Problems Daughter      Social History     Socioeconomic History    Marital status: Legally      Spouse name: None    Number of children: None    Years of education: None    Highest education level: None   Occupational History    None   Social Needs    Financial resource strain: None    Food insecurity     Worry: None     Inability: None    Transportation needs     Medical: None     Non-medical: None   Tobacco Use    Smoking status: Never Smoker    Smokeless tobacco: Never Used   Substance and Sexual Activity    Alcohol use: Yes     Comment: social    Drug use: No    Sexual activity: Yes     Partners: Male   Lifestyle    Physical activity     Days per week: None     Minutes per session: None    Stress: None   Relationships    Social connections     Talks on phone: None     Gets together: None     Attends Oriental orthodox service: None     Active member of club or organization: None     Attends meetings of clubs or organizations: None     Relationship status: None    Intimate partner violence     Fear of current or ex partner: None     Emotionally abused: None     Physically abused: None     Forced sexual activity: None   Other Topics Concern    None   Social History Narrative    None     Current Outpatient Medications   Medication Sig Dispense Refill    liothyronine (CYTOMEL) 5 MCG tablet 2 tablets daily 60 tablet 5    levothyroxine (SYNTHROID) 50 MCG tablet 1 tablet daily 30 tablet 5    citalopram (CELEXA) 20 MG tablet TAKE 1 TABLET BY MOUTH EVERY NIGHT AT BEDTIME 90 tablet 3    nadolol (CORGARD) 20 MG tablet Take 1 tablet by mouth daily 90 tablet 1    SF 1.1 % GEL USE AT BEDTIME. SPIT AFTER USE WITHOUT RINSING. DO NOT EAT OR DRINK FOR 30 MINUTES      diclofenac (VOLTAREN) 75 MG EC tablet Take 1 tablet by mouth 2 times daily Always with food 60 tablet 3    norethindrone-ethinyl estradiol (JINTELI) 1-5 MG-MCG TABS per tablet Take 1 tablet by mouth daily       No current facility-administered medications for this visit. No Known Allergies  Family Status   Relation Name Status    Mother  Alive        HTN,chronic renal disease,xsmoker    Father          HTN-paraplegic after fastpitch injury age 13!     PGM          ASCAD/pancreatic ca    MGM          DM    PGF          alzheimers    MGF      Brother  Alive    Son  Alive    Ricardo  Alive       Review of Systems:  Constitutional: has fatigue, no fever, no recent weight gain, no recent weight loss, no changes in appetite, hard to lose weight  Eyes: no eye pain, has change in vision, no eye redness, has eye irritation, no double vision  Ears, nose, throat: no nasal congestion, no sore throat, no earache, no decrease in hearing, no hoarseness, no dry mouth, no sinus problems, no difficulty swallowing, no neck lumps, no dental problems, no mouth sores, no ringing in ears  Pulmonary: no shortness of breath, no wheezing, no dyspnea on exertion, no cough  Cardiovascular: no chest pain, no lower extremity edema, no orthopnea, no intermittent leg claudication, has palpitations  Gastrointestinal: no abdominal pain, no nausea, no vomiting, no diarrhea, no constipation, no dysphagia, no heartburn, no bloating  Genitourinary: no dysuria, no urinary incontinence, no urinary hesitancy, no urinary frequency, no feelings of urinary urgency, has nocturia  Musculoskeletal: no joint swelling, no joint stiffness, has joint pain, no muscle cramps, no muscle pain, no bone pain  Integument/Breast: no hair loss, no skin rashes, no skin lesions, no itching, no dry skin  Neurological: no numbness, no tingling, no weakness, no confusion, no headaches, no dizziness, no fainting, no tremors, no decrease in memory, no balance problems  Psychiatric: has anxiety, has depression, has insomnia  Hematologic/Lymphatic: no tendency for easy bleeding, no swollen lymph nodes, no tendency for easy bruising  Immunology: has seasonal allergies, no frequent infections, no frequent illnesses  Endocrine: no temperature intolerance, has hot flashes    /70   Pulse 52   Resp 14   Ht 5' 7\" (1.702 m)   Wt 180 lb (81.6 kg)   BMI 28.19 kg/m²    Wt Readings from Last 3 Encounters:   02/12/21 180 lb (81.6 kg)   11/11/20 177 lb (80.3 kg)   08/12/20 177 lb 9.6 oz (80.6 kg)     Body mass index is 28.19 kg/m².     OBJECTIVE:  Constitutional: no acute distress, well appearing and well nourished  Psychiatric: oriented to person, place and time, judgement and insight and normal, recent and remote memory and intact and mood and affect are normal  Skin: skin and subcutaneous tissue is normal without mass, normal turgor  Head and Face: examination of head and face revealed no abnormalities  Eyes: no lid or conjunctival swelling, erythema or discharge, pupils are normal, equal, round, reactive to light  Ears/Nose: external inspection of ears and nose revealed no abnormalities, hearing is grossly normal  Oropharynx/Mouth/Face: lips, tongue and gums are normal with no lesions, the voice quality was normal  Neck: neck is supple and symmetric, with midline trachea and no masses, thyroid is normal  Lymphatics: normal cervical lymph nodes, normal supraclavicular nodes  Pulmonary: no increased work of breathing or signs of respiratory distress, lungs are clear to auscultation  Cardiovascular: normal heart rate and rhythm, normal S1 and S2, no murmurs and pedal pulses and 2+ bilaterally, No edema  Abdomen: abdomen is soft, non-tender with no masses  Musculoskeletal: normal gait and station and exam of the digits and nails are normal  Neurological: normal coordination and normal general cortical function      Lab Review:    Lab Results   Component Value Date    WBC 7.2 02/05/2021    HGB 14.8 02/05/2021    HCT 44.3 02/05/2021    MCV 98.5 02/05/2021     02/05/2021     Lab Results   Component Value Date     02/05/2021    K 4.6 02/05/2021     02/05/2021    CO2 22 02/05/2021    BUN 11 02/05/2021    CREATININE 1.0 02/05/2021    GLUCOSE 81 02/05/2021    GLUCOSE 76 12/14/2011    CALCIUM 9.4 02/05/2021    PROT 6.9 02/05/2021    PROT 6.9 02/22/2013    LABALBU 4.4 02/05/2021    BILITOT 0.6 02/05/2021    ALKPHOS 65 02/05/2021    AST 20 02/05/2021    ALT 15 02/05/2021    LABGLOM 57 02/05/2021    LABGLOM 64 10/24/2013    GFRAA >60 02/05/2021    AGRATIO 1.8 02/05/2021    GLOB 2.5 02/05/2021     Lab Results   Component Value Date    TSH 3.11 02/05/2021    FT3 4.3 02/05/2021     Lab Results   Component Value Date    LABA1C 4.7 02/05/2021     Lab Results   Component Value Date    EAG 88.2 02/05/2021     Lab Results   Component Value Date    CHOL 160 02/05/2021     Lab Results   Component Value Date    TRIG 93 02/05/2021     Lab Results   Component Value Date    HDL 59 02/05/2021     Lab Results   Component Value Date    LDLCALC 82 02/05/2021     Lab Results   Component Value Date    LABVLDL 19 02/05/2021     Lab Results   Component Value Date    CHOLHDLRATIO 2.7 10/24/2013     Lab Results   Component Value Date    LABMICR <0.2 11/19/2010     Lab Results   Component Value Date    VITD25 40.4 08/03/2020        ASSESSMENT/PLAN:  1. Acquired hypothyroidism  Separate from food by 1 hour  TSH 2.5-1.4-2.1-3.11  Next appointment increase medication dose if TSH remains above 2.5  Levothyroxine 0.05 mg qd, liothyronine 5 mcg 2 tabs daily.  - TSH without Reflex; Future  - T4, Free; Future  - T3, Free; Future  - Comprehensive Metabolic Panel; Future  - T3, Reverse; Future    2.  Menopause  Calcium, vitamin D  - DEXA Bone Density Axial Skeleton; Future    3. Osteopenia, unspecified location  Calcium, vitamin D  - DEXA Bone Density Axial Skeleton; Future    4. Vitamin D deficiency  25 hydroxy vitamin D 29-37- 40.4  Vitamin D supplement 2000 IU qd. Bone no bone pain  New problem    5. IFG   Glucose 101-81  Hemoglobin A1c 5.0-4.7    Reviewed and/or ordered clinical lab results Yes  Reviewed and/or ordered radiology tests Yes   Reviewed and/or ordered other diagnostic tests No  Discussed test results with performing physician No  Independently reviewed image, tracing, or specimen No  Made a decision to obtain old records No  Reviewed and summarized old records Yes  Obtained history from other than patient No    Guilherme Lynn was counseled regarding symptoms of thyroid, osteopenia, vitamin D deficiency diagnosis, course and complications of disease if inadequately treated, side effects of medications, diagnosis, treatment options, and prognosis, risks, benefits, complications, and alternatives of treatment, labs, imaging and other studies and treatment targets and goals. She understands instructions and counseling. Return in about 4 months (around 6/12/2021) for thyroid problems.

## 2021-03-11 ENCOUNTER — PATIENT MESSAGE (OUTPATIENT)
Dept: INTERNAL MEDICINE CLINIC | Age: 61
End: 2021-03-11

## 2021-03-11 NOTE — TELEPHONE ENCOUNTER
From: Ollie Greenberg  To: Reddy Cano MD  Sent: 3/11/2021 2:32 PM EST  Subject: Non-Urgent Nickie Santana,    Do you recommend I get the COVID vaccine? I'm not in any rush, and the only reason I can think of for me to get it is really in case my mother ends up in the hospital for some reason, then I want to be able to be with her. She of course has had both doses of her vaccine. Thanks!   Lucy Cannon

## 2021-06-11 DIAGNOSIS — E03.9 ACQUIRED HYPOTHYROIDISM: ICD-10-CM

## 2021-06-11 DIAGNOSIS — R73.01 IFG (IMPAIRED FASTING GLUCOSE): ICD-10-CM

## 2021-06-11 DIAGNOSIS — Z78.0 MENOPAUSE: ICD-10-CM

## 2021-06-11 DIAGNOSIS — E55.9 VITAMIN D DEFICIENCY: ICD-10-CM

## 2021-06-11 LAB
A/G RATIO: 1.7 (ref 1.1–2.2)
ALBUMIN SERPL-MCNC: 4 G/DL (ref 3.4–5)
ALP BLD-CCNC: 58 U/L (ref 40–129)
ALT SERPL-CCNC: 12 U/L (ref 10–40)
ANION GAP SERPL CALCULATED.3IONS-SCNC: 9 MMOL/L (ref 3–16)
AST SERPL-CCNC: 15 U/L (ref 15–37)
BILIRUB SERPL-MCNC: 0.4 MG/DL (ref 0–1)
BUN BLDV-MCNC: 13 MG/DL (ref 7–20)
CALCIUM SERPL-MCNC: 8.9 MG/DL (ref 8.3–10.6)
CHLORIDE BLD-SCNC: 109 MMOL/L (ref 99–110)
CO2: 25 MMOL/L (ref 21–32)
CREAT SERPL-MCNC: 0.9 MG/DL (ref 0.6–1.1)
GFR AFRICAN AMERICAN: >60
GFR NON-AFRICAN AMERICAN: >60
GLOBULIN: 2.4 G/DL
GLUCOSE BLD-MCNC: 96 MG/DL (ref 70–99)
POTASSIUM SERPL-SCNC: 4.3 MMOL/L (ref 3.5–5.1)
SODIUM BLD-SCNC: 143 MMOL/L (ref 136–145)
T3 FREE: 3.6 PG/ML (ref 2.3–4.2)
T4 FREE: 1.2 NG/DL (ref 0.9–1.8)
TOTAL PROTEIN: 6.4 G/DL (ref 6.4–8.2)
TSH SERPL DL<=0.05 MIU/L-ACNC: 2.55 UIU/ML (ref 0.27–4.2)
VITAMIN D 25-HYDROXY: 31.8 NG/ML

## 2021-06-18 ENCOUNTER — TELEPHONE (OUTPATIENT)
Dept: INTERNAL MEDICINE CLINIC | Age: 61
End: 2021-06-18

## 2021-06-18 NOTE — TELEPHONE ENCOUNTER
Can you call her again later to check on her- I was on the plane when I talked to her so apologize cause she couldnt hear me well but since she is 50+ I told her to go to urgent care since we cant know whats really going on without her being seen- dont we have an NP for backup? ? And check does she have an appt sometime in the next few months w me for a physical or whenever she is due?

## 2021-06-18 NOTE — TELEPHONE ENCOUNTER
[phone] (home)   CALLED HAVING SOME UPPER BACK PAIN /CHEST DISCOMFORT X WKS OFF AND ON PLEASE ADVISE This message sent to DR KEARNEY. NO APPTS.  AVAILABLE

## 2021-06-21 ENCOUNTER — OFFICE VISIT (OUTPATIENT)
Dept: ENDOCRINOLOGY | Age: 61
End: 2021-06-21
Payer: COMMERCIAL

## 2021-06-21 VITALS
DIASTOLIC BLOOD PRESSURE: 62 MMHG | OXYGEN SATURATION: 98 % | HEIGHT: 67 IN | SYSTOLIC BLOOD PRESSURE: 102 MMHG | HEART RATE: 58 BPM | WEIGHT: 178 LBS | BODY MASS INDEX: 27.94 KG/M2

## 2021-06-21 DIAGNOSIS — E03.9 ACQUIRED HYPOTHYROIDISM: Primary | ICD-10-CM

## 2021-06-21 DIAGNOSIS — Z78.0 MENOPAUSE: ICD-10-CM

## 2021-06-21 DIAGNOSIS — R73.01 IFG (IMPAIRED FASTING GLUCOSE): ICD-10-CM

## 2021-06-21 DIAGNOSIS — M85.80 OSTEOPENIA, UNSPECIFIED LOCATION: ICD-10-CM

## 2021-06-21 DIAGNOSIS — E55.9 VITAMIN D DEFICIENCY: ICD-10-CM

## 2021-06-21 PROCEDURE — 99204 OFFICE O/P NEW MOD 45 MIN: CPT | Performed by: INTERNAL MEDICINE

## 2021-06-21 RX ORDER — LIOTHYRONINE SODIUM 5 UG/1
TABLET ORAL
Qty: 60 TABLET | Refills: 5 | Status: SHIPPED | OUTPATIENT
Start: 2021-06-21 | End: 2021-06-21

## 2021-06-21 RX ORDER — LEVOTHYROXINE SODIUM 0.07 MG/1
75 TABLET ORAL DAILY
Qty: 30 TABLET | Refills: 3 | Status: SHIPPED | OUTPATIENT
Start: 2021-06-21 | End: 2021-09-28 | Stop reason: SDUPTHER

## 2021-06-21 RX ORDER — NICOTINE POLACRILEX 4 MG/1
GUM, CHEWING ORAL
COMMUNITY
Start: 2021-06-18

## 2021-06-21 RX ORDER — ALUMINUM HYDROXIDE, MAGNESIUM HYDROXIDE, SIMETHICONE 400; 400; 40 MG/10ML; MG/10ML; MG/10ML
SUSPENSION ORAL
COMMUNITY
Start: 2021-06-18

## 2021-06-21 RX ORDER — LIOTHYRONINE SODIUM 5 UG/1
5 TABLET ORAL DAILY
Qty: 30 TABLET | Refills: 3 | Status: SHIPPED | OUTPATIENT
Start: 2021-06-21 | End: 2021-09-28 | Stop reason: SDUPTHER

## 2021-06-21 RX ORDER — LEVOTHYROXINE SODIUM 0.05 MG/1
TABLET ORAL
Qty: 30 TABLET | Refills: 5 | Status: SHIPPED | OUTPATIENT
Start: 2021-06-21 | End: 2021-06-21

## 2021-06-21 NOTE — PROGRESS NOTES
have osteoporosis.  The Z-value   compares the patient's bone mineral density with age and   sex-matched peers (average BMD of same age, sex and race matched   controls).          A T score below -2.5, especially in the presence of risk factors   in post menopausal women, indicates the need for treatment to   prevent fractures.  A T score below -1 within five years after   menopause or a Z score below -1 at any age indicates the need to   prevent further bone loss.  A Z score below -2 indicates   accelerated bone loss and suggests further studies to identify   possible major risk factors (2).          The risk of vertebral or hip fracture approximately doubles   (1.5-2.5) for each decrease of 1 standard deviation in T-score   (2).  Low bone density is not the only risk factor for fracture. Other risk factors are patient age, risk of falling, previous   osteoporotic fracture and family history of osteoporosis.          Serial examinations of bone mineral density are most cost   effective when performed every 12-24 months because of the   relatively slow rate of bone resorption and remodeling.  The   change in a patient's bone mineral density between the two exams   should be at least 5% to be meaningful (4.2-5.5% for the lumbar   spine, 5.5-8.5% for the hip)  (3).          References:        (1) International Society for Clinical Densitometry 2007. (2) [facility], [address] [place] [address]; [phone].     (3) MASON Morrison.  [employer] Int  2686; 62: 207-214.               EXAMINATION:   THYROID ULTRASOUND       7/23/2018       COMPARISON:   None.       HISTORY:   ORDERING PHYSICIAN PROVIDED HISTORY: Goiter   TECHNOLOGIST PROVIDED HISTORY:   Technologist Provided Reason for Exam: nodule   Acuity: Unknown   Type of Encounter: Unknown       FINDINGS:   Right thyroid lobe:  3.6 x 1.1 x 1.0 cm       Left thyroid lobe:  3.6 x 1.3 x 1.2 cm       Isthmus:  2 mm       Thyroid Gland:  Thyroid gland demonstrates normal echotexture and vascularity.       Nodules: No thyroid nodules are present.       Cervical lymphadenopathy: No abnormal lymph nodes in the imaged portions of   the neck.           Impression   Unremarkable thyroid ultrasound. Past Medical History:   Diagnosis Date    Bilateral closed proximal tibial stress fracture 8/6/2015    MRI dated 7/28/2015 shows a nondisplaced fracture of the medial proximal tibial metaphysis of both knees     Foot pain 10/13/2010    Pain in posterior tibial tendon- Dr. Azul Franco podiatry     Hypothyroidism     Menopausal state age 48    MVP (mitral valve prolapse)     last echo 9/2006 showed MVP otherwise nl-no regurg or stenosis of MV    Nondisplaced fracture of fifth metatarsal bone, left foot, initial encounter for closed fracture 1/11/2019 5/2018 in [facility]  In [address] [place] [address] for 10 weeks     Stress fracture of tibia 9/28/2017    Bilateral 2016-followed by Dr. Emir Albarran Bilateral stress fractures!     Tear of medial meniscus of right knee 7/21/2015    Tendonitis, tibialis 10/13/2010     Patient Active Problem List    Diagnosis Date Noted    Pain of left clavicle 11/11/2020    IFG (impaired fasting glucose) 02/10/2020    Low back pain 10/08/2019    Vitamin D deficiency 08/08/2019    Adenomatous polyp 10/10/2017    Constipation 12/18/2015    Hypothyroidism     Osteopenia     Chondromalacia patellae of left knee 07/21/2015    Bilateral knee pain 07/14/2015    Fatigue 04/27/2015    Visual disturbance 03/29/2013    Ocular muscular dystrophy ([facility] [address] 03/29/2013    Headache 03/29/2013    Insomnia 11/07/2011    Anxiety 11/07/2011    PAC (premature atrial contraction) 01/09/2011    Family history of diabetes mellitus 10/13/2010    Hypertension 10/13/2010    Mitral valve prolapse 10/13/2010    Menopause 10/13/2010     Past Surgical History:   Procedure Laterality Date    BREAST ENHANCEMENT SURGERY Bilateral     91, 00, 04    COLONOSCOPY  2013    adenomatous polyp 2-13 for 5 yr marva  COLONOSCOPY  2/26/18  repeat x 5  yrs.  DILATION AND CURETTAGE OF UTERUS       Family History   Problem Relation Age of Onset    Thyroid Disease Mother     High Cholesterol Mother     Hypertension Mother     Other Father         Atherosclerotic Disease; Spastic paraparesis of legs     No Known Problems Brother     No Known Problems Son     No Known Problems Daughter      Social History     Socioeconomic History    Marital status:      Spouse name: None    Number of children: None    Years of education: None    Highest education level: None   Occupational History    None   Tobacco Use    Smoking status: Never Smoker    Smokeless tobacco: Never Used   Substance and Sexual Activity    Alcohol use: Yes     Comment: social    Drug use: No    Sexual activity: Yes     Partners: Male   Other Topics Concern    None   Social History Narrative    None     Social Determinants of Health     Financial Resource Strain:     Difficulty of Paying Living Expenses:    Food Insecurity:     Worried About Running Out of Food in the Last Year:     [address] in the Last Year:    Transportation Needs:     Lack of Transportation (Medical):      Lack of Transportation (Non-Medical):    Physical Activity:     Days of Exercise per Week:     Minutes of Exercise per Session:    Stress:     Feeling of Stress :    Social Connections:     Frequency of Communication with Friends and Family:     Frequency of Social Gatherings with Friends and Family:     Attends Jainism Services:     Active Member of Clubs or Organizations:     Attends Club or Organization Meetings:     Marital Status:    Intimate Partner Violence:     Fear of Current or Ex-Partner:     Emotionally Abused:     Physically Abused:     Sexually Abused:      Current Outpatient Medications   Medication Sig Dispense Refill    levothyroxine (SYNTHROID) 50 MCG tablet 1 tablet daily 30 tablet 5    liothyronine (CYTOMEL) 5 MCG tablet 2 tablets daily 60 tablet 5    citalopram (CELEXA) 20 MG tablet TAKE 1 TABLET BY MOUTH EVERY NIGHT AT BEDTIME 90 tablet 3    nadolol (CORGARD) 20 MG tablet Take 1 tablet by mouth daily 90 tablet 1    SF 1.1 % GEL USE AT BEDTIME. SPIT AFTER USE WITHOUT RINSING. DO NOT EAT OR DRINK FOR 30 MINUTES      diclofenac (VOLTAREN) 75 MG EC tablet Take 1 tablet by mouth 2 times daily Always with food 60 tablet 3    norethindrone-ethinyl estradiol (JINTELI) 1-5 MG-MCG TABS per tablet Take 1 tablet by mouth daily      omeprazole 20 MG EC tablet TAKE 1 TABLET BY MOUTH TWICE DAILY FOR 2 WEEKS      ANTACID 200-200-20 MG/5ML SUSP suspension SHAKE LIQUID AND TAKE 10 ML BY MOUTH FOUR TIMES DAILY AS NEEDED FOR INDIGESTION       No current facility-administered medications for this visit. No Known Allergies  Family Status   Relation Name Status    Mother  Alive        HTN,chronic renal disease,xsmoker    Father          HTN-paraplegic after fastpitch injury age 13!     PGM          ASCAD/pancreatic ca    MGM          DM    PGF          alzheimers    MGF      Brother  Alive    Son  Alive    Ricardo  Alive       Review of Systems:  Constitutional: has fatigue, no fever, no recent weight gain, no recent weight loss, no changes in appetite, hard to lose weight  Eyes: no eye pain, has change in vision, no eye redness, has eye irritation, no double vision  Ears, nose, throat: no nasal congestion, no sore throat, no earache, no decrease in hearing, no hoarseness, no dry mouth, no sinus problems, no difficulty swallowing, no neck lumps, no dental problems, no mouth sores, no ringing in ears  Pulmonary: no shortness of breath, no wheezing, no dyspnea on exertion, no cough  Cardiovascular: no chest pain, no lower extremity edema, no orthopnea, no intermittent leg claudication, has palpitations  Gastrointestinal: no abdominal pain, no nausea, no vomiting, no diarrhea, no constipation, no dysphagia, no heartburn, no bloating  Genitourinary: no dysuria, no urinary incontinence, no urinary hesitancy, no urinary frequency, no feelings of urinary urgency, has nocturia  Musculoskeletal: no joint swelling, no joint stiffness, has joint pain, no muscle cramps, no muscle pain, no bone pain  Integument/Breast: no hair loss, no skin rashes, no skin lesions, no itching, no dry skin  Neurological: no numbness, no tingling, no weakness, no confusion, no headaches, no dizziness, no fainting, no tremors, no decrease in memory, no balance problems  Psychiatric: has anxiety, has depression, has insomnia  Hematologic/Lymphatic: no tendency for easy bleeding, no swollen lymph nodes, no tendency for easy bruising  Immunology: has seasonal allergies, no frequent infections, no frequent illnesses  Endocrine: no temperature intolerance, has hot flashes    /62   Pulse 58   Ht 5' 7\" (1.702 m)   Wt 178 lb (80.7 kg)   SpO2 98%   BMI 27.88 kg/m²    Wt Readings from Last 3 Encounters:   06/21/21 178 lb (80.7 kg)   02/12/21 180 lb (81.6 kg)   11/11/20 177 lb (80.3 kg)     Body mass index is 27.88 kg/m².     OBJECTIVE:  Constitutional: no acute distress, well appearing and well nourished  Psychiatric: oriented to person, place and time, judgement and insight and normal, recent and remote memory and intact and mood and affect are normal  Skin: skin and subcutaneous tissue is normal without mass, normal turgor  Head and Face: examination of head and face revealed no abnormalities  Eyes: no lid or conjunctival swelling, erythema or discharge, pupils are normal, equal, round, reactive to light  Ears/Nose: external inspection of ears and nose revealed no abnormalities, hearing is grossly normal  Oropharynx/Mouth/Face: lips, tongue and gums are normal with no lesions, the voice quality was normal  Neck: neck is supple and symmetric, with midline trachea and no masses, thyroid is normal  Lymphatics: normal cervical lymph nodes, normal supraclavicular nodes  Pulmonary: no increased work of breathing or signs of respiratory distress, lungs are clear to auscultation  Cardiovascular: normal heart rate and rhythm, normal S1 and S2, no murmurs and pedal pulses and 2+ bilaterally, No edema  Abdomen: abdomen is soft, non-tender with no masses  Musculoskeletal: normal gait and station and exam of the digits and nails are normal  Neurological: normal coordination and normal general cortical function      Lab Review:    Lab Results   Component Value Date    WBC 7.2 02/05/2021    HGB 14.8 02/05/2021    HCT 44.3 02/05/2021    MCV 98.5 02/05/2021     02/05/2021     Lab Results   Component Value Date     06/11/2021    K 4.3 06/11/2021     06/11/2021    CO2 25 06/11/2021    BUN 13 06/11/2021    CREATININE 0.9 06/11/2021    GLUCOSE 96 06/11/2021    GLUCOSE 76 12/14/2011    CALCIUM 8.9 06/11/2021    PROT 6.4 06/11/2021    PROT 6.9 02/22/2013    LABALBU 4.0 06/11/2021    BILITOT 0.4 06/11/2021    ALKPHOS 58 06/11/2021    AST 15 06/11/2021    ALT 12 06/11/2021    LABGLOM >60 06/11/2021    LABGLOM 64 10/24/2013    GFRAA >60 06/11/2021    AGRATIO 1.7 06/11/2021    GLOB 2.4 06/11/2021     Lab Results   Component Value Date    TSH 2.55 06/11/2021    FT3 3.6 06/11/2021     Lab Results   Component Value Date    LABA1C 4.7 02/05/2021     Lab Results   Component Value Date    EAG 88.2 02/05/2021     Lab Results   Component Value Date    CHOL 160 02/05/2021     Lab Results   Component Value Date    TRIG 93 02/05/2021     Lab Results   Component Value Date    HDL 59 02/05/2021     Lab Results   Component Value Date    LDLCALC 82 02/05/2021     Lab Results   Component Value Date    LABVLDL 19 02/05/2021     Lab Results   Component Value Date    CHOLHDLRATIO 2.7 10/24/2013     Lab Results   Component Value Date    LABMICR <0.2 11/19/2010     Lab Results   Component Value Date    VITD25 31.8 06/11/2021        ASSESSMENT/PLAN:  1.  Acquired hypothyroidism    TSH 2.5-1.4-2.1-3.112.55  Increase Levothyroxine to 0.075 mg qd  Decrease Liothyronine 5 mcg to 1 tabs daily.  - TSH without Reflex; Future  - T4, Free; Future  - T3, Free; Future    2. Menopause  Calcium, vitamin D  - DEXA Bone Density Axial Skeleton; Future    3. Osteopenia, unspecified location  Calcium, vitamin D  - DEXA Bone Density Axial Skeleton; Future    4. Vitamin D deficiency  25 hydroxy vitamin D 29-37- 40.431.8  Vitamin D supplement 2000 IU qd.    5. IFG   Glucose 101-81-96  Hemoglobin A1c 5.0-4.7    Reviewed and/or ordered clinical lab results Yes  Reviewed and/or ordered radiology tests Yes   Reviewed and/or ordered other diagnostic tests No  Discussed test results with performing physician No  Independently reviewed image, tracing, or specimen No  Made a decision to obtain old records No  Reviewed and summarized old records Yes  Obtained history from other than patient No    CHRISTAL FOSTER was counseled regarding symptoms of thyroid, osteopenia, vitamin D deficiency diagnosis, course and complications of disease if inadequately treated, side effects of medications, diagnosis, treatment options, and prognosis, risks, benefits, complications, and alternatives of treatment, labs, imaging and other studies and treatment targets and goals. She understands instructions and counseling. Return in about 3 months (around 9/21/2021) for thyroid problems.

## 2021-06-23 ENCOUNTER — HOSPITAL ENCOUNTER (OUTPATIENT)
Dept: MAMMOGRAPHY | Age: 61
Discharge: HOME | End: 2021-06-23
Payer: COMMERCIAL

## 2021-06-23 VITALS — BODY MASS INDEX: 27.47 KG/M2 | WEIGHT: 175 LBS | HEIGHT: 67 IN

## 2021-06-23 DIAGNOSIS — Z12.31 ENCOUNTER FOR SCREENING MAMMOGRAM FOR BREAST CANCER: ICD-10-CM

## 2021-06-23 PROCEDURE — 77063 BREAST TOMOSYNTHESIS BI: CPT

## 2021-07-09 ENCOUNTER — TELEPHONE (OUTPATIENT)
Dept: INTERNAL MEDICINE CLINIC | Age: 61
End: 2021-07-09

## 2021-07-09 RX ORDER — NADOLOL 20 MG/1
20 TABLET ORAL DAILY
Qty: 90 TABLET | Refills: 0 | Status: SHIPPED | OUTPATIENT
Start: 2021-07-09 | End: 2021-10-06

## 2021-07-09 NOTE — TELEPHONE ENCOUNTER
Your Child's Health  15-Month-Old Visit      Nanette Alonso  January 19, 2016    Visit Vitals  Pulse 120   Temp 98.5 °F (36.9 °C) (Temporal Artery)   Resp 24   Ht 32\" (81.3 cm)   Wt 10.1 kg   HC 44.5 cm (17.52\")   BMI 15.28 kg/m²     Weight:   22lb 4oz    NUTRITION:    Growth slows down after 3 year of age. Nanette's appetite may decrease and become more sporadic. Serve Nanette food in small portions. Let her be the  of how much to eat. You should be the one to control what food is available and when. Set a good example with your own eating habits right from the start.        Encourage self-feeding of table food. Using finger-thumb grasp and the spoon will help improve Nanette's motor skills and teaches independence. Picky and sporadic feeding is normal. Most children grow despite picky eating. Playing games trying to force feed will only worsen the behavior. Sometimes, children can be picky eaters because they have had too much juice or milk, especially before meals.        Excessive fruit juice may cause gas, increased weight, and bowel movements that vary from diarrhea to constipation. Whole fruit is preferable.        The amount of milk should be between 16-24 ounces per day to provide for adequate calcium for bones and teeth. Too much milk can cause anemia (a low blood count).       Being overweight is a serious problem in the [place]. You can help your child avoid this problem by following these guidelines:    1. American Academy of Pediatrics recommends no screen time under 2 years.    2. Don't encourage your children to eat if they tell you they are full. Healthy children will not starve themselves.     3. Don't reward your children for cleaning their plate. If they always leave food, cut down the size of the portion and have them ask for more if hungry.    4. Don't allow children to dish out their own portions. They will imitate adults, or imitate what they have seen on TV; in both  Pharmacy called and is requesting a refill on nadolol (CORGARD) 20 MG tablet.        Pharmacy is confirmed correct in chart cases, the amount will be too large. This results in increased calorie intake and waste. At least for children above 5 years of age and for adults, people eat more when given larger portions.  5. Avoid juice and if it must be used, water it down 1/3 juice and 2/3 water. No soda.        DEVELOPMENT/BEHAVIOR:    Most children will point to body parts at this age.    Most children will understand language more than they will be able to vocalize. Most know 4-6 words, even if the parents are the only ones who can understand those words.    Nanette will begin to assert her independence right about the time that she walks independently.    DISCIPLINE  1. Parents should discuss and agree on the approach to discipline and which behaviors are to be forbidden. Remember that things that are \"cute\" (like hitting) when Nanette is young will not be so cute when she is a teenager.   2. Long lectures after Nanette misbehaves will not work; rather they actually reinforce the misbehavior by giving it attention. After all, attention is the thing that Nanette most desires from you. Help yourself by being brief and by walling off hazards and breakables. Nobody has the energy to teach all the rules at once.  Work on a few things at a time.  3. Children are smart enough to learn that daddy and mommy may tolerate different things but for dangerous behavior it is important for all caretakers to be consistent.  Nanette will learn faster if the messages about dangerous activities are always the same.  4. Tantrums will appear soon. If your child throws a temper tantrum place them in a safe place and walk away. Children don't understand the differences between good and bad attention.      SAFETY/ACCIDENT PREVENTION:  1. Car Safety: A rear facing car seat in the back seat is the safest place in the car, especially in a car with passenger side airbags. Forward facing seats are not recommended until your child is two years of  age.  2. Poisoning: Use safety caps on medicines. Household  and polishes must be kept out of reach. Store medicines and  out of sight. Don't transfer medicines to non-childproofed or unlabeled containers. Dispose of unused medications. Be especially careful when visiting older persons or families without children in the house. They may be in the habit of keeping their medicines out on tables. Syrup of Ipecac is no longer recommended to be kept in the home. Please dispose of any you might have. Call the clinic at [phone] or the Poison Control Center at [phone] (long distance [phone]) for any known or suspected poisoning.  3. Falls: Remove furniture with sharp edges. Coffee tables are especially hazardous.  Use de guzman on stairs and window latches on second story windows.  4. Choking: Avoid peanuts, gum, hard candy, raisins, or popcorn until Nanette is five years old. Nanette can choke on foods that she can't chew well.  Don't let Nanette eat while running or playing. Be sure foods such as grapes, peas, and corn are prepared in a size and consistency that she can handle.  5. Aguilar: Begin to teach hot and cold. Have a family fire safety plan including regular smoke detector battery checks, working fire extinguishers, and two planned escape routes.  6. Visiting: Be alert for safety hazards when visiting other households (especially in homes not used to having children around.)    GENERAL ADVICE:  Smoking: Children exposed to cigarette smoke have more ear infections and pneumonia. Cold symptoms may last longer. If you smoke, please quit. If you cannot quit, smoke outside of your home. Please don't let others smoke in your home.    Sleep Practices: The basic principles of good sleep are similar to those for adults:  1. Provide a consistent bedtime.  2. Provide a consistent time for the evening meal, preferably not immediately before bedtime.  3. Provide a reassuring routine (stories,  prayers, songs etc.).  4. Provide a transition object. This might be a pillow, favorite blanket, or stuffed animal, but avoid providing transition objects for the mouth. Don't allow objects on strings or that could be swallowed.  5. Provide a consistent waking time: this is important unless Nanette is ill and needs extra sleep. Allowing children who have slept poorly to \"sleep in\" delays meals and naps and gets them further off schedule. You would do better to allow a longer nap.  6. Avoid stimulating activity before bedtime -- for example: scary or intense movies especially where children in the movie are frightened or hurt, similar television programs, and high-level physical activity.    SHOES:  Your baby's feet will develop fine whether they have shoes or not. Children can run barefoot indoors and on safe outdoor surfaces. Shoes are needed once Nanette begins walking for warmth, protection and, of course, for cuteness.  Choose shoes that have the following: roomy fit, flat and flexible soles, and non-skid bottoms.    SUN EXPOSURE:  If you plan to have Nanette outside, please apply sun screen.    MEDICAL ADVICE:  Tuberculosis: There is such a low rate of tuberculosis in our area that frequent skin tests are no longer recommended. However, certain situations increase Nanette's risk of bennett tuberculosis including: contact with AIDS patients, visits to nursing homes, or contact with prisoners, immigrants, or the homeless. Please let us know if any of these risk factors apply to you or Nanette.    MEDICATION FOR FEVER OR PAIN:   Acetaminophen liquid (e.g., Tylenol or Tempra) may be given every four hours as needed for pain or fever.  Be sure to check which product CONCENTRATION you are using.    INFANT Tylenol/Acetaminophen  Drops (160 mg/5 mL)    Child’s Weight: Dose:  18 - 23 pounds:   120 mg (3.75 mL (3/4 Teaspoon))  23 - 27 pounds:   160 mg (5.0 mL (1 Teaspoon))    CHILDREN’S  Tylenol/Acetaminophen  (160 mg/5 mL)    Child’s Weight: Dose:  18 - 23 pounds:   120 mg (3.75 mL (3/4 Teaspoon))  23 - 27 pounds:   160 mg (5.0 mL (1 Teaspoon))    INFANT Ibuprofen (50 mg/1.25 ml)  liquid (for example Advil or Motrin) may be given every 6 hours as needed for pain or fever.    Child’s Weight: Dose:  18 - 23 pounds:   80 - 100 mg (2.0 - 2.5 mL (2/5 - 1/2 Teaspoon))  23 - 27 pounds:   100 - 125 mg (2.5 - 3.125 mL (1/2 - 2/3 Teaspoon))      CHILDREN'S Ibuprofen (100 mg/5 mL) liquid (for example Advil or Motrin) may be given every 6 hours as needed for pain or fever.    Child’s Weight: Dose:  18 - 23 pounds:   80 - 100 mg (4.0 - 5.0 mL (4/5 - 1 Teaspoon))  23 - 27 pounds:   100 - 125 mg (5.0 - 6.25 mL (1 - 1 1/4 Teaspoons))    If Nanette is outside these weight ranges, call your pediatrician's office for advice.    Most Recent Immunizations   Administered Date(s) Administered   • DTaP/Hep B/IPV 04/18/2017   • HIB, Unspecified Formulation 02/14/2017   • Hep B, adolescent or pediatric 2016   • Influenza, injectable, quadrivalent, preservative free, pediatric 11/20/2017   • Influenza, injectable, quadrivalent, preservative-free 10/16/2017   • Measles Mumps Rubella Varicella 10/16/2017   • Pneumococcal Conjugate 13 valent 10/16/2017   • Rotavirus - monovalent 02/14/2017   Pended Date(s) Pended   • DTaP (INFANRIX) 01/19/2018   • Hib (PRP-OMP) 01/19/2018       If Nanette develops any of the following reactions within 72 hours after an immunization, notify your pediatrician by calling the pediatric phone nurse:  1. A temperature of 105 degrees or above  2. More than 3 hours of continuous crying  3. A shrill, high-pitched cry  4. A pale, limp spell  5. A seizure or fainting spell.  In this case, you should call 911 or go immediately to the emergency room.      NEXT VISIT: 18 MONTHS OF AGE    Thank you for entrusting your care to [facility].        Patient Education     Night Terrors  Night  terrors usually affect children ages 4 to12. These are not the same as nightmares. A night terror usually awakens a child within a couple hours after falling asleep. They occur during deeper stages of (non-REM) sleep, between dream cycles. It is not a sign of medical illness or psychological problems. The cause is uncertain, but it may be more likely after a day of over-exertion, stress, and exhaustion.  There are a number of things you can see when your child has a night terror: He or she may:  · Have a look of fear or panic  · Be sleeping, and suddenly sits up in bed with his or her eyes wide open  · Scream or cry out  · Breathe fast, have a fast heart rate, and be gasping, moaning, mumbling, or thrashing  · Seem confused and won’t recognize you and will not aware of what is happening  A night terror usually lasts only a few minutes to a half an hour, and then they normally go back to sleep.  Home care  · Have the same bedtime and wake-up time for your child (on both school and non-school days).  · Have a set bedtime routine.  · Avoid high-energy activities during the hour before bedtime.  · Make the hour before bedtime a quiet time.  · Don't have a TV in your child's bedroom.  · Keep your child's room dark and quiet.  · Use a small night light if your child is afraid of the dark.  · When your child awakens with a night terror, stay close and try to comfort your child until it passes.  · Do not try to wake up your child.  Care during an episode  During a night terror, there is usually nothing you can do to calm your child. Take precautions so that your child does not hurt him- or herself if agitated. Eventually, the reaction stops and your child quickly falls back to sleep. The next day your child probably won't remember what happened the night before.  Waking your child up may make him or her scared and agitated. This is especially true if you were upset, shaking them, yelling or crying. It is much better to just  make sure your child is safe. After your child wakes, provide comfort and help him or her go back to sleep.  Night terrors can recur, but most children outgrow them, as they get older. This is not a disease, and no medical treatment is needed.  Follow-up care  Follow up with your healthcare provider, or as advised.  Call 911  Call 911 if any of the following occur:  · Trouble breathing  · Seizure  When to seek medical advice   Call your healthcare provider right away of these occur:    · Abnormal behavior or confusion that occurs during daytime, waking hours  · Stiff neck  · Headache  · Night terrors occur more than 1 or 2 times per month  · A night terror episode does not stop after 45 minutes  Fever is usually a sign of infection in a child, and is usually caused by a virus. However, in this situation, call your child’s healthcare provider right away if:  · Your child is of any age and has repeated fevers above 104°F (40°C).  · Your child is younger than 2 years of age and a fever of 100.4°F (38°C) continues for more than 1 day.  · Your child is 2 years old or older and a fever of 100.4°F (38°C) continues for more than 3 days.  © 0426-7233 [employer]. [address] [place] [address], [place] [address]. All rights reserved. This information is not intended as a substitute for professional medical care. Always follow your healthcare professional's instructions.

## 2021-09-28 ENCOUNTER — OFFICE VISIT (OUTPATIENT)
Dept: ENDOCRINOLOGY | Age: 61
End: 2021-09-28
Payer: COMMERCIAL

## 2021-09-28 VITALS
WEIGHT: 179.4 LBS | BODY MASS INDEX: 28.16 KG/M2 | OXYGEN SATURATION: 98 % | RESPIRATION RATE: 14 BRPM | SYSTOLIC BLOOD PRESSURE: 120 MMHG | TEMPERATURE: 98 F | HEIGHT: 67 IN | DIASTOLIC BLOOD PRESSURE: 78 MMHG | HEART RATE: 56 BPM

## 2021-09-28 DIAGNOSIS — E03.9 ACQUIRED HYPOTHYROIDISM: Primary | ICD-10-CM

## 2021-09-28 DIAGNOSIS — R73.01 IFG (IMPAIRED FASTING GLUCOSE): ICD-10-CM

## 2021-09-28 DIAGNOSIS — M85.80 OSTEOPENIA, UNSPECIFIED LOCATION: ICD-10-CM

## 2021-09-28 DIAGNOSIS — Z78.0 MENOPAUSE: ICD-10-CM

## 2021-09-28 DIAGNOSIS — E55.9 VITAMIN D DEFICIENCY: ICD-10-CM

## 2021-09-28 PROCEDURE — 99214 OFFICE O/P EST MOD 30 MIN: CPT | Performed by: INTERNAL MEDICINE

## 2021-09-28 RX ORDER — LEVOTHYROXINE SODIUM 0.07 MG/1
TABLET ORAL
Qty: 35 TABLET | Refills: 3 | Status: SHIPPED | OUTPATIENT
Start: 2021-09-28 | End: 2022-01-14

## 2021-09-28 RX ORDER — LIOTHYRONINE SODIUM 5 UG/1
5 TABLET ORAL DAILY
Qty: 30 TABLET | Refills: 3 | Status: SHIPPED | OUTPATIENT
Start: 2021-09-28 | End: 2022-01-14

## 2021-09-28 NOTE — PROGRESS NOTES
SUBJECTIVE:  Pranav Gaona is a 63 y.o. female who is here for hypothyroidism. 1. Acquired hypothyroidism    This started in 2010. Patient was diagnosed with hypothyroidism. The problem has been unchanged. Patient started medication in 2015. Currently patient is on: levothyroxine, liothyronide. Misses  0 doses a month. Current complaints:  fatigue  Has a lot of stress at work. History of obstructive symptoms: difficulty swallowing No, changes in voice/hoarseness No.  History of radiation to patient's neck: No  Resent iodine exposure: No  Family history includes hypothyroidism, goiter  Family history of thyroid cancer: No    2. Menopause  Periods stopped at age 50. No hot flashes on estrogen. 3. Osteopenia, unspecified location  Had stress fractures in tibula. Healed recent 5th metatarsal left leg fracture. No bone pain. 4.  Vitamin D deficiency  No bone pain. 5. IFG   Has family Hx of diabetes    BONE DENSITOMETRY (Dexa)        HISTORY: Postmenopausal estrogen deficiency            Impression   IMPRESSION:        Normal mineralization of the lumbar spine with T. evaluate -0.9.    There is mild osteopenia of the L4 segment with T. evaluate -1.3       Osteopenia of the total hip and femoral neck with T. diagnosis of   -1.2 and -1.6, respectively       Comparison with 2010 shows 5.4% increase in bone mineral density   of the spine and 2.5% increase in bone mineral density of the hip       Frax index indicates a 5.9% risk of major osteoporotic fracture   over the next 10 years and 0.5% risk of hip fracture over the same   time frame       The T-value compares the patient's bone mineral density with the   peak bone mass of young normal patient's (average BMD of young   age, sex and race matched controls).  Accor ding to the WHO (World   Health Organization) criteria, patients with T-values between -1   and -2.5 have a low bone mass or density (osteopenia).  Patients   with T-values less than -2.5 have osteoporosis.  The Z-value   compares the patient's bone mineral density with age and   sex-matched peers (average BMD of same age, sex and race matched   controls).          A T score below -2.5, especially in the presence of risk factors   in post menopausal women, indicates the need for treatment to   prevent fractures.  A T score below -1 within five years after   menopause or a Z score below -1 at any age indicates the need to   prevent further bone loss.  A Z score below -2 indicates   accelerated bone loss and suggests further studies to identify   possible major risk factors (2).          The risk of vertebral or hip fracture approximately doubles   (1.5-2.5) for each decrease of 1 standard deviation in T-score   (2).  Low bone density is not the only risk factor for fracture. Other risk factors are patient age, risk of falling, previous   osteoporotic fracture and family history of osteoporosis.          Serial examinations of bone mineral density are most cost   effective when performed every 12-24 months because of the   relatively slow rate of bone resorption and remodeling.  The   change in a patient's bone mineral density between the two exams   should be at least 5% to be meaningful (4.2-5.5% for the lumbar   spine, 5.5-8.5% for the hip)  (3).          References:        (1) International Society for Clinical Densitometry 2007. (2) [address]; [id number].     (3) MASON Morrison.  [employer] Tissue Int  0536; 62: 207-214.               EXAMINATION:   THYROID ULTRASOUND       7/23/2018       COMPARISON:   None.       HISTORY:   ORDERING PHYSICIAN PROVIDED HISTORY: Goiter   TECHNOLOGIST PROVIDED HISTORY:   Technologist Provided Reason for Exam: nodule   Acuity: Unknown   Type of Encounter: Unknown       FINDINGS:   Right thyroid lobe:  3.6 x 1.1 x 1.0 cm       Left thyroid lobe:  3.6 x 1.3 x 1.2 cm       Isthmus:  2 mm       Thyroid Gland:  Thyroid gland demonstrates normal echotexture and vascularity.       Nodules: No thyroid nodules are present.       Cervical lymphadenopathy: No abnormal lymph nodes in the imaged portions of   the neck.           Impression   Unremarkable thyroid ultrasound. Past Medical History:   Diagnosis Date    Bilateral closed proximal tibial stress fracture 8/6/2015    MRI dated 7/28/2015 shows a nondisplaced fracture of the medial proximal tibial metaphysis of both knees     Foot pain 10/13/2010    Pain in posterior tibial tendon- Dr. Corinne Freeze podiatry     Hypothyroidism     Menopausal state age 48    MVP (mitral valve prolapse)     last echo 9/2006 showed MVP otherwise nl-no regurg or stenosis of MV    Nondisplaced fracture of fifth metatarsal bone, left foot, initial encounter for closed fracture 1/11/2019 5/2018 in [facility]  In [address] [place] [address] for 10 weeks     Stress fracture of tibia 9/28/2017    Bilateral 2016-followed by Dr. Franck Moreno Bilateral stress fractures!     Tear of medial meniscus of right knee 7/21/2015    Tendonitis, tibialis 10/13/2010     Patient Active Problem List    Diagnosis Date Noted    Pain of left clavicle 11/11/2020    IFG (impaired fasting glucose) 02/10/2020    Low back pain 10/08/2019    Vitamin D deficiency 08/08/2019    Adenomatous polyp 10/10/2017    Constipation 12/18/2015    Hypothyroidism     Osteopenia     Chondromalacia patellae of left knee 07/21/2015    Bilateral knee pain 07/14/2015    Fatigue 04/27/2015    Visual disturbance 03/29/2013    Ocular muscular dystrophy ([facility] [address] 03/29/2013    Headache 03/29/2013    Insomnia 11/07/2011    Anxiety 11/07/2011    PAC (premature atrial contraction) 01/09/2011    Family history of diabetes mellitus 10/13/2010    Hypertension 10/13/2010    Mitral valve prolapse 10/13/2010    Menopause 10/13/2010     Past Surgical History:   Procedure Laterality Date    BREAST ENHANCEMENT SURGERY Bilateral     91, 00, 04    COLONOSCOPY  2013    adenomatous polyp 2-13 for 5 yr marva  COLONOSCOPY  2/26/18  repeat x 5  yrs.  DILATION AND CURETTAGE OF UTERUS       Family History   Problem Relation Age of Onset    Thyroid Disease Mother     High Cholesterol Mother     Hypertension Mother     Other Father         Atherosclerotic Disease; Spastic paraparesis of legs     No Known Problems Brother     No Known Problems Son     No Known Problems Daughter      Social History     Socioeconomic History    Marital status:      Spouse name: None    Number of children: None    Years of education: None    Highest education level: None   Occupational History    None   Tobacco Use    Smoking status: Never Smoker    Smokeless tobacco: Never Used   Substance and Sexual Activity    Alcohol use: Not Currently     Comment: social    Drug use: No    Sexual activity: Yes     Partners: Male   Other Topics Concern    None   Social History Narrative    None     Social Determinants of Health     Financial Resource Strain:     Difficulty of Paying Living Expenses:    Food Insecurity:     Worried About Running Out of Food in the Last Year:     [address] in the Last Year:    Transportation Needs:     Lack of Transportation (Medical):      Lack of Transportation (Non-Medical):    Physical Activity:     Days of Exercise per Week:     Minutes of Exercise per Session:    Stress:     Feeling of Stress :    Social Connections:     Frequency of Communication with Friends and Family:     Frequency of Social Gatherings with Friends and Family:     Attends Shinto Services:     Active Member of Clubs or Organizations:     Attends Club or Organization Meetings:     Marital Status:    Intimate Partner Violence:     Fear of Current or Ex-Partner:     Emotionally Abused:     Physically Abused:     Sexually Abused:      Current Outpatient Medications   Medication Sig Dispense Refill    levothyroxine (SYNTHROID) 75 MCG tablet 1 tablet 6 days a week, one and a half tablet 1 day a week 35 tablet 3    liothyronine (CYTOMEL) 5 MCG tablet Take 1 tablet by mouth daily tablets daily 30 tablet 3    nadolol (CORGARD) 20 MG tablet Take 1 tablet by mouth daily 90 tablet 0    omeprazole 20 MG EC tablet TAKE 1 TABLET BY MOUTH TWICE DAILY FOR 2 WEEKS      ANTACID 200-200-20 MG/5ML SUSP suspension SHAKE LIQUID AND TAKE 10 ML BY MOUTH FOUR TIMES DAILY AS NEEDED FOR INDIGESTION      citalopram (CELEXA) 20 MG tablet TAKE 1 TABLET BY MOUTH EVERY NIGHT AT BEDTIME 90 tablet 3    SF 1.1 % GEL USE AT BEDTIME. SPIT AFTER USE WITHOUT RINSING. DO NOT EAT OR DRINK FOR 30 MINUTES      diclofenac (VOLTAREN) 75 MG EC tablet Take 1 tablet by mouth 2 times daily Always with food 60 tablet 3    norethindrone-ethinyl estradiol (JINTELI) 1-5 MG-MCG TABS per tablet Take 1 tablet by mouth daily       No current facility-administered medications for this visit. No Known Allergies  Family Status   Relation Name Status    Mother  Alive        HTN,chronic renal disease,xsmoker    Father          HTN-paraplegic after fastpitch injury age 13!     PGM          ASCAD/pancreatic ca    MGM          DM    PGF          alzheimers    MGF      Brother  Alive    Son  Alive    Ricardo  Alive       Review of Systems:  Constitutional: has fatigue, no fever, no recent weight gain, no recent weight loss, no changes in appetite, hard to lose weight  Eyes: no eye pain, has change in vision, no eye redness, has eye irritation, no double vision  Ears, nose, throat: no nasal congestion, no sore throat, no earache, no decrease in hearing, no hoarseness, no dry mouth, no sinus problems, no difficulty swallowing, no neck lumps, no dental problems, no mouth sores, no ringing in ears  Pulmonary: no shortness of breath, no wheezing, no dyspnea on exertion, no cough  Cardiovascular: no chest pain, no lower extremity edema, no orthopnea, no intermittent leg claudication, has palpitations  Gastrointestinal: no abdominal pain, no nausea, no vomiting, no diarrhea, no constipation, no dysphagia, no heartburn, no bloating  Genitourinary: no dysuria, no urinary incontinence, no urinary hesitancy, no urinary frequency, no feelings of urinary urgency, has nocturia  Musculoskeletal: no joint swelling, no joint stiffness, has joint pain, no muscle cramps, no muscle pain, no bone pain  Integument/Breast: no hair loss, no skin rashes, no skin lesions, no itching, no dry skin  Neurological: no numbness, no tingling, no weakness, no confusion, no headaches, no dizziness, no fainting, no tremors, no decrease in memory, no balance problems  Psychiatric: has anxiety, has depression, has insomnia  Hematologic/Lymphatic: no tendency for easy bleeding, no swollen lymph nodes, no tendency for easy bruising  Immunology: has seasonal allergies, no frequent infections, no frequent illnesses  Endocrine: no temperature intolerance, has hot flashes    /78   Pulse 56   Temp 98 °F (36.7 °C)   Resp 14   Ht 5' 7\" (1.702 m)   Wt 179 lb 6.4 oz (81.4 kg)   SpO2 98%   BMI 28.10 kg/m²    Wt Readings from Last 3 Encounters:   09/28/21 179 lb 6.4 oz (81.4 kg)   06/23/21 175 lb (79.4 kg)   06/21/21 178 lb (80.7 kg)     Body mass index is 28.1 kg/m².     OBJECTIVE:  Constitutional: no acute distress, well appearing and well nourished  Psychiatric: oriented to person, place and time, judgement and insight and normal, recent and remote memory and intact and mood and affect are normal  Skin: skin and subcutaneous tissue is normal without mass, normal turgor  Head and Face: examination of head and face revealed no abnormalities  Eyes: no lid or conjunctival swelling, erythema or discharge, pupils are normal, equal, round, reactive to light  Ears/Nose: external inspection of ears and nose revealed no abnormalities, hearing is grossly normal  Oropharynx/Mouth/Face: lips, tongue and gums are normal with no lesions, the voice quality was normal  Neck: neck is supple and symmetric, with midline trachea and no masses, thyroid is normal  Lymphatics: normal cervical lymph nodes, normal supraclavicular nodes  Pulmonary: no increased work of breathing or signs of respiratory distress, lungs are clear to auscultation  Cardiovascular: normal heart rate and rhythm, normal S1 and S2, no murmurs and pedal pulses and 2+ bilaterally, No edema  Abdomen: abdomen is soft, non-tender with no masses  Musculoskeletal: normal gait and station and exam of the digits and nails are normal  Neurological: normal coordination and normal general cortical function      Lab Review:    Lab Results   Component Value Date    WBC 7.2 02/05/2021    HGB 14.8 02/05/2021    HCT 44.3 02/05/2021    MCV 98.5 02/05/2021     02/05/2021     Lab Results   Component Value Date     09/10/2021    K 4.5 09/10/2021     09/10/2021    CO2 22 09/10/2021    BUN 16 09/10/2021    CREATININE 0.8 09/10/2021    GLUCOSE 90 09/10/2021    GLUCOSE 76 12/14/2011    CALCIUM 9.3 09/10/2021    PROT 7.0 09/10/2021    PROT 6.9 02/22/2013    LABALBU 4.3 09/10/2021    BILITOT 0.3 09/10/2021    ALKPHOS 65 09/10/2021    AST 17 09/10/2021    ALT 12 09/10/2021    LABGLOM >60 09/10/2021    LABGLOM 64 10/24/2013    GFRAA >60 09/10/2021    AGRATIO 1.6 09/10/2021    GLOB 2.7 09/10/2021     Lab Results   Component Value Date    TSH 2.05 09/10/2021    FT3 2.9 09/10/2021     Lab Results   Component Value Date    LABA1C 4.9 09/10/2021     Lab Results   Component Value Date    EAG 93.9 09/10/2021     Lab Results   Component Value Date    CHOL 160 02/05/2021     Lab Results   Component Value Date    TRIG 93 02/05/2021     Lab Results   Component Value Date    HDL 59 02/05/2021     Lab Results   Component Value Date    LDLCALC 82 02/05/2021     Lab Results   Component Value Date    LABVLDL 19 02/05/2021     Lab Results   Component Value Date    CHOLHDLRATIO 2.7 10/24/2013     Lab Results   Component Value Date    LABMICR <0.2 11/19/2010     Lab Results   Component Value Date    VITD25 40.7 09/10/2021        ASSESSMENT/PLAN:  1. Acquired hypothyroidism  TSH 2.5-1.4-2.1-3.112.552.05  Increase Levothyroxine to 0.075 mg 6 days a week, one and a half tablet 1 day a week  Continue Liothyronine 5 mcg to 1 tabs daily.  - TSH without Reflex; Future  - T4, Free; Future  - T3, Free; Future    2. Menopause  Calcium, vitamin D  - DEXA Bone Density Axial Skeleton; Future    3. Osteopenia, unspecified location  Calcium, vitamin D  - DEXA Bone Density Axial Skeleton; Future    4. Vitamin D deficiency  25 hydroxy vitamin D 29-37- 40.431.840.7  Vitamin D supplement 2000 IU qd.    5. IFG   Glucose 101-81-9690  Hemoglobin A1c 5.0-4.74.9    Reviewed and/or ordered clinical lab results Yes  Reviewed and/or ordered radiology tests Yes   Reviewed and/or ordered other diagnostic tests No  Discussed test results with performing physician No  Independently reviewed image, tracing, or specimen No  Made a decision to obtain old records No  Reviewed and summarized old records Yes  Obtained history from other than patient No    CHRISTAL FOSTER was counseled regarding symptoms of thyroid, osteopenia, vitamin D deficiency diagnosis, course and complications of disease if inadequately treated, side effects of medications, diagnosis, treatment options, and prognosis, risks, benefits, complications, and alternatives of treatment, labs, imaging and other studies and treatment targets and goals. She understands instructions and counseling. Return in about 4 months (around 1/28/2022) for thyroid problems.

## 2021-10-06 RX ORDER — NADOLOL 20 MG/1
20 TABLET ORAL DAILY
Qty: 90 TABLET | Refills: 0 | Status: SHIPPED | OUTPATIENT
Start: 2021-10-06 | End: 2021-12-09 | Stop reason: SDUPTHER

## 2021-12-09 RX ORDER — NADOLOL 20 MG/1
20 TABLET ORAL DAILY
Qty: 20 TABLET | Refills: 0 | Status: SHIPPED | OUTPATIENT
Start: 2021-12-09 | End: 2022-01-04 | Stop reason: SDUPTHER

## 2022-01-04 RX ORDER — NADOLOL 20 MG/1
20 TABLET ORAL DAILY
Qty: 30 TABLET | Refills: 0 | Status: SHIPPED | OUTPATIENT
Start: 2022-01-04 | End: 2022-01-14 | Stop reason: SDUPTHER

## 2022-01-06 DIAGNOSIS — E78.5 HYPERLIPIDEMIA, UNSPECIFIED HYPERLIPIDEMIA TYPE: ICD-10-CM

## 2022-01-06 DIAGNOSIS — M85.80 OSTEOPENIA, UNSPECIFIED LOCATION: ICD-10-CM

## 2022-01-06 DIAGNOSIS — R73.01 IFG (IMPAIRED FASTING GLUCOSE): ICD-10-CM

## 2022-01-06 DIAGNOSIS — E55.9 VITAMIN D DEFICIENCY: ICD-10-CM

## 2022-01-06 DIAGNOSIS — Z78.0 MENOPAUSE: ICD-10-CM

## 2022-01-06 DIAGNOSIS — I10 PRIMARY HYPERTENSION: Primary | ICD-10-CM

## 2022-01-06 DIAGNOSIS — E03.9 ACQUIRED HYPOTHYROIDISM: ICD-10-CM

## 2022-01-06 LAB
A/G RATIO: 1.6 (ref 1.1–2.2)
ALBUMIN SERPL-MCNC: 4.1 G/DL (ref 3.4–5)
ALP BLD-CCNC: 63 U/L (ref 40–129)
ALT SERPL-CCNC: 15 U/L (ref 10–40)
ANION GAP SERPL CALCULATED.3IONS-SCNC: 11 MMOL/L (ref 3–16)
AST SERPL-CCNC: 21 U/L (ref 15–37)
BILIRUB SERPL-MCNC: 0.3 MG/DL (ref 0–1)
BUN BLDV-MCNC: 16 MG/DL (ref 7–20)
CALCIUM SERPL-MCNC: 9.4 MG/DL (ref 8.3–10.6)
CHLORIDE BLD-SCNC: 104 MMOL/L (ref 99–110)
CO2: 25 MMOL/L (ref 21–32)
CREAT SERPL-MCNC: 1 MG/DL (ref 0.6–1.1)
GFR AFRICAN AMERICAN: >60
GFR NON-AFRICAN AMERICAN: 57
GLUCOSE BLD-MCNC: 94 MG/DL (ref 70–99)
POTASSIUM SERPL-SCNC: 4.5 MMOL/L (ref 3.5–5.1)
SODIUM BLD-SCNC: 140 MMOL/L (ref 136–145)
T3 FREE: 3.3 PG/ML (ref 2.3–4.2)
T4 FREE: 1.5 NG/DL (ref 0.9–1.8)
TOTAL PROTEIN: 6.7 G/DL (ref 6.4–8.2)
TSH SERPL DL<=0.05 MIU/L-ACNC: 1.56 UIU/ML (ref 0.27–4.2)
VITAMIN D 25-HYDROXY: 31.2 NG/ML

## 2022-01-07 DIAGNOSIS — E78.5 HYPERLIPIDEMIA, UNSPECIFIED HYPERLIPIDEMIA TYPE: ICD-10-CM

## 2022-01-07 DIAGNOSIS — I10 PRIMARY HYPERTENSION: ICD-10-CM

## 2022-01-07 LAB
BASOPHILS ABSOLUTE: 0 K/UL (ref 0–0.2)
BASOPHILS RELATIVE PERCENT: 0.2 %
CHOLESTEROL, TOTAL: 157 MG/DL (ref 0–199)
EOSINOPHILS ABSOLUTE: 0.1 K/UL (ref 0–0.6)
EOSINOPHILS RELATIVE PERCENT: 2.2 %
ESTIMATED AVERAGE GLUCOSE: 99.7 MG/DL
HBA1C MFR BLD: 5.1 %
HCT VFR BLD CALC: 44.6 % (ref 36–48)
HDLC SERPL-MCNC: 54 MG/DL (ref 40–60)
HEMOGLOBIN: 14.1 G/DL (ref 12–16)
LDL CHOLESTEROL CALCULATED: 86 MG/DL
LYMPHOCYTES ABSOLUTE: 1.8 K/UL (ref 1–5.1)
LYMPHOCYTES RELATIVE PERCENT: 30.5 %
MCH RBC QN AUTO: 32.5 PG (ref 26–34)
MCHC RBC AUTO-ENTMCNC: 31.7 G/DL (ref 31–36)
MCV RBC AUTO: 102.5 FL (ref 80–100)
MONOCYTES ABSOLUTE: 0.4 K/UL (ref 0–1.3)
MONOCYTES RELATIVE PERCENT: 6.6 %
NEUTROPHILS ABSOLUTE: 3.5 K/UL (ref 1.7–7.7)
NEUTROPHILS RELATIVE PERCENT: 60.5 %
PDW BLD-RTO: 13.6 % (ref 12.4–15.4)
PLATELET # BLD: 262 K/UL (ref 135–450)
PMV BLD AUTO: 9.2 FL (ref 5–10.5)
RBC # BLD: 4.35 M/UL (ref 4–5.2)
TRIGL SERPL-MCNC: 87 MG/DL (ref 0–150)
VLDLC SERPL CALC-MCNC: 17 MG/DL
WBC # BLD: 5.8 K/UL (ref 4–11)

## 2022-01-14 ENCOUNTER — OFFICE VISIT (OUTPATIENT)
Dept: INTERNAL MEDICINE CLINIC | Age: 62
End: 2022-01-14
Payer: COMMERCIAL

## 2022-01-14 ENCOUNTER — OFFICE VISIT (OUTPATIENT)
Dept: ENDOCRINOLOGY | Age: 62
End: 2022-01-14
Payer: COMMERCIAL

## 2022-01-14 VITALS
WEIGHT: 185 LBS | DIASTOLIC BLOOD PRESSURE: 63 MMHG | HEIGHT: 67 IN | SYSTOLIC BLOOD PRESSURE: 106 MMHG | BODY MASS INDEX: 29.03 KG/M2 | TEMPERATURE: 97.9 F

## 2022-01-14 VITALS
BODY MASS INDEX: 28.98 KG/M2 | HEART RATE: 54 BPM | DIASTOLIC BLOOD PRESSURE: 73 MMHG | OXYGEN SATURATION: 99 % | SYSTOLIC BLOOD PRESSURE: 135 MMHG | WEIGHT: 185 LBS

## 2022-01-14 DIAGNOSIS — M85.80 OSTEOPENIA, UNSPECIFIED LOCATION: ICD-10-CM

## 2022-01-14 DIAGNOSIS — I10 PRIMARY HYPERTENSION: ICD-10-CM

## 2022-01-14 DIAGNOSIS — Z00.00 ENCOUNTER FOR WELL ADULT EXAM WITHOUT ABNORMAL FINDINGS: Primary | ICD-10-CM

## 2022-01-14 DIAGNOSIS — E03.9 ACQUIRED HYPOTHYROIDISM: ICD-10-CM

## 2022-01-14 DIAGNOSIS — F51.01 PRIMARY INSOMNIA: ICD-10-CM

## 2022-01-14 DIAGNOSIS — E55.9 VITAMIN D DEFICIENCY: ICD-10-CM

## 2022-01-14 DIAGNOSIS — Z78.0 MENOPAUSE: ICD-10-CM

## 2022-01-14 DIAGNOSIS — R73.01 IFG (IMPAIRED FASTING GLUCOSE): ICD-10-CM

## 2022-01-14 DIAGNOSIS — E03.9 ACQUIRED HYPOTHYROIDISM: Primary | ICD-10-CM

## 2022-01-14 DIAGNOSIS — D36.9 ADENOMATOUS POLYP: ICD-10-CM

## 2022-01-14 PROCEDURE — 99396 PREV VISIT EST AGE 40-64: CPT | Performed by: INTERNAL MEDICINE

## 2022-01-14 PROCEDURE — 99214 OFFICE O/P EST MOD 30 MIN: CPT | Performed by: INTERNAL MEDICINE

## 2022-01-14 RX ORDER — NADOLOL 20 MG/1
90 TABLET ORAL DAILY
Qty: 90 TABLET | Refills: 3 | Status: SHIPPED | OUTPATIENT
Start: 2022-01-14 | End: 2022-01-19 | Stop reason: SDUPTHER

## 2022-01-14 RX ORDER — LEVOTHYROXINE SODIUM 0.07 MG/1
TABLET ORAL
Qty: 35 TABLET | Refills: 5 | Status: SHIPPED | OUTPATIENT
Start: 2022-01-14 | End: 2022-07-12

## 2022-01-14 RX ORDER — LIOTHYRONINE SODIUM 5 UG/1
5 TABLET ORAL DAILY
Qty: 30 TABLET | Refills: 5 | Status: SHIPPED | OUTPATIENT
Start: 2022-01-14 | End: 2022-07-12

## 2022-01-14 SDOH — ECONOMIC STABILITY: FOOD INSECURITY: WITHIN THE PAST 12 MONTHS, THE FOOD YOU BOUGHT JUST DIDN'T LAST AND YOU DIDN'T HAVE MONEY TO GET MORE.: NEVER TRUE

## 2022-01-14 SDOH — ECONOMIC STABILITY: FOOD INSECURITY: WITHIN THE PAST 12 MONTHS, YOU WORRIED THAT YOUR FOOD WOULD RUN OUT BEFORE YOU GOT MONEY TO BUY MORE.: NEVER TRUE

## 2022-01-14 ASSESSMENT — ENCOUNTER SYMPTOMS
COLOR CHANGE: 0
CHEST TIGHTNESS: 0
ABDOMINAL PAIN: 0
BACK PAIN: 0
WHEEZING: 0

## 2022-01-14 ASSESSMENT — PATIENT HEALTH QUESTIONNAIRE - PHQ9
SUM OF ALL RESPONSES TO PHQ9 QUESTIONS 1 & 2: 0
SUM OF ALL RESPONSES TO PHQ QUESTIONS 1-9: 0
2. FEELING DOWN, DEPRESSED OR HOPELESS: 0
SUM OF ALL RESPONSES TO PHQ QUESTIONS 1-9: 0
1. LITTLE INTEREST OR PLEASURE IN DOING THINGS: 0
SUM OF ALL RESPONSES TO PHQ QUESTIONS 1-9: 0
SUM OF ALL RESPONSES TO PHQ QUESTIONS 1-9: 0

## 2022-01-14 ASSESSMENT — SOCIAL DETERMINANTS OF HEALTH (SDOH): HOW HARD IS IT FOR YOU TO PAY FOR THE VERY BASICS LIKE FOOD, HOUSING, MEDICAL CARE, AND HEATING?: NOT HARD AT ALL

## 2022-01-14 NOTE — PROGRESS NOTES
Well Adult Note  Name: Nima Kaba Date: 2022   MRN: [id number] Sex: Female   Age: 63 y.o. Ethnicity: Non- / Non    : 1960 Race: White (non-)      Joby Christy is here for well adult exam.  History:    [facility] [employer] and is working out 3 times a week and loving it- seeing Dr. Sandhya Riley for her thyroid-still not sleeping well- wakes up 2-3 times nightly-     Review of Systems   Constitutional: Negative for activity change, appetite change and fatigue. HENT: Negative for congestion. Eyes: Negative for visual disturbance. Respiratory: Negative for chest tightness and wheezing. Cardiovascular: Negative for chest pain and palpitations. Gastrointestinal: Negative for abdominal pain. Musculoskeletal: Negative for arthralgias and back pain. Skin: Negative for color change and rash. Neurological: Negative for weakness, light-headedness and headaches. Psychiatric/Behavioral: Positive for sleep disturbance. No Known Allergies      Prior to Visit Medications    Medication Sig Taking? Authorizing Provider   levothyroxine (SYNTHROID) 75 MCG tablet 1 tablet 6 days a week, one and a half tablet 1 day a week Yes Gio Amin MD   liothyronine (CYTOMEL) 5 MCG tablet Take 1 tablet by mouth daily tablets daily Yes Gio Amin MD   nadolol (CORGARD) 20 MG tablet Take 4.5 tablets by mouth daily No further refills until seen Yes Bob Youngblood MD   omeprazole 20 MG EC tablet TAKE 1 TABLET BY MOUTH TWICE DAILY FOR 2 WEEKS Yes Historical Provider, MD   ANTACID [id number] MG/5ML SUSP suspension SHAKE LIQUID AND TAKE 10 ML BY MOUTH FOUR TIMES DAILY AS NEEDED FOR INDIGESTION Yes Historical Provider, MD   SF 1.1 % GEL USE AT BEDTIME. SPIT AFTER USE WITHOUT RINSING.  DO NOT EAT OR DRINK FOR 30 MINUTES Yes Historical Provider, MD   norethindrone-ethinyl estradiol (JINTELI) 1-5 MG-MCG TABS per tablet Take 1 tablet by mouth daily Yes Historical Provider, MD         Past Medical History:   Diagnosis Date    Bilateral closed proximal tibial stress fracture 8/6/2015    MRI dated 7/28/2015 shows a nondisplaced fracture of the medial proximal tibial metaphysis of both knees     Chondromalacia patellae of left knee 7/21/2015    Foot pain 10/13/2010    Pain in posterior tibial tendon- Dr. [facility] podiatry     Hypothyroidism     Menopausal state age 48    MVP (mitral valve prolapse)     last echo 9/2006 showed MVP otherwise nl-no regurg or stenosis of MV    Nondisplaced fracture of fifth metatarsal bone, left foot, initial encounter for closed fracture 1/11/2019 5/2018 in [facility]  In [address] [place] [address] for 10 weeks     Stress fracture of tibia 9/28/2017    Bilateral 2016-followed by Dr. Delfino Meneses Bilateral stress fractures!  Tear of medial meniscus of right knee 7/21/2015    Tendonitis, tibialis 10/13/2010       Past Surgical History:   Procedure Laterality Date    BREAST ENHANCEMENT SURGERY Bilateral     91, 00, 04    COLONOSCOPY  2013    adenomatous polyp 2-13 for 5 yr marva    COLONOSCOPY  2/26/18  repeat x 5  yrs.  DILATION AND CURETTAGE OF UTERUS           Family History   Problem Relation Age of Onset    Thyroid Disease Mother     High Cholesterol Mother     Hypertension Mother     Other Father         Atherosclerotic Disease; Spastic paraparesis of legs     No Known Problems Brother     No Known Problems Son     No Known Problems Daughter        Social History     Tobacco Use    Smoking status: Never Smoker    Smokeless tobacco: Never Used   Substance Use Topics    Alcohol use: Not Currently     Comment: social    Drug use: No       Objective   /63   Temp 97.9 °F (36.6 °C)   Ht 5' 7\" (1.702 m)   Wt 185 lb (83.9 kg)   BMI 28.98 kg/m²   Wt Readings from Last 3 Encounters:   01/14/22 185 lb (83.9 kg)   01/14/22 185 lb (83.9 kg)   09/28/21 179 lb 6.4 oz (81.4 kg)     There were no vitals filed for this visit.       Physical Exam  Constitutional: Appearance: She is well-developed. HENT:      Head: Normocephalic and atraumatic. Eyes:      Pupils: Pupils are equal, round, and reactive to light. Cardiovascular:      Rate and Rhythm: Normal rate and regular rhythm. Pulmonary:      Effort: Pulmonary effort is normal.      Breath sounds: Normal breath sounds. Skin:     General: Skin is warm and dry. Neurological:      Mental Status: She is alert and oriented to person, place, and time. Assessment   Plan   1. Encounter for well adult exam without abnormal findings  2. Adenomatous polyp  Assessment & Plan: For repeat 2023  3. Acquired hypothyroidism  Assessment & Plan:   Controlled w current regimen- continue and monitor closely    4. Primary hypertension  Assessment & Plan:   Controlled w current regimen- continue and monitor closely    5. Primary insomnia  Assessment & Plan: To look into more info on sleep per NAVIN talks   6.  Vitamin D deficiency  Assessment & Plan:   Supplement increased          Personalized Preventive Plan   Current Health Maintenance Status  Immunization History   Administered Date(s) Administered    COVID-19, Pfizer ALMA ROSA-SUCROSE,  5-11 yrs , PF, 10mcg/0.2 mL Dose 04/17/2021, 05/08/2021, 11/24/2021    Flu 18 Yrs Intradermal, Preservative Free 10/20/2014    Influenza Vaccine, unspecified formulation 10/21/2016, 10/30/2018    Influenza Virus Vaccine 10/30/2018, 10/29/2019, 09/30/2020    Influenza, Intradermal, Preservative free 10/14/2021    Tdap (Boostrix, Adacel) 01/24/2013, 01/05/2018, 01/05/2019        Health Maintenance   Topic Date Due    Depression Screen  Never done    COVID-19 Vaccine (1) [birth year]/1974    Shingles Vaccine (1 of 2) Never done    A1C test (Diabetic or Prediabetic)  01/06/2023    TSH testing  01/06/2023    Colon cancer screen colonoscopy  02/26/2023    Breast cancer screen  06/23/2023    Cervical cancer screen  07/01/2025    Lipid screen  01/06/2027    DTaP/Tdap/Td vaccine (4 - Td or Tdap) 01/05/2029    Flu vaccine  Completed    Hepatitis C screen  Completed    HIV screen  Completed    Hepatitis A vaccine  Aged Out    Hepatitis B vaccine  Aged Out    Hib vaccine  Aged Out    Meningococcal (ACWY) vaccine  Aged Out    Pneumococcal 0-64 years Vaccine  Aged Out     Recommendations for [email or web address] Due: see orders and patient instructions/AVS.    No follow-ups on file.

## 2022-01-14 NOTE — PROGRESS NOTES
SUBJECTIVE:  Tawny Martinez is a 63 y.o. female who is here for hypothyroidism. 1. Acquired hypothyroidism    This started in 2010. Patient was diagnosed with hypothyroidism. The problem has been unchanged. Patient started medication in 2015. Currently patient is on: levothyroxine, liothyronide. Misses  0 doses a month. Current complaints:  fatigue  Has a lot of stress at work. Exercises, eating better    History of obstructive symptoms: difficulty swallowing No, changes in voice/hoarseness No.  History of radiation to patient's neck: No  Resent iodine exposure: No  Family history includes hypothyroidism, goiter  Family history of thyroid cancer: No    2. Menopause  Periods stopped at age 50. No hot flashes on estrogen. 3. Osteopenia, unspecified location  Had stress fractures in tibula. Healed recent 5th metatarsal left leg fracture. No bone pain. 4.  Vitamin D deficiency  No bone pain. 5. IFG   Has family Hx of diabetes    BONE DENSITOMETRY (Dexa)        HISTORY: Postmenopausal estrogen deficiency            Impression   IMPRESSION:        Normal mineralization of the lumbar spine with T. evaluate -0.9.    There is mild osteopenia of the L4 segment with T. evaluate -1.3       Osteopenia of the total hip and femoral neck with T. diagnosis of   -1.2 and -1.6, respectively       Comparison with 2010 shows 5.4% increase in bone mineral density   of the spine and 2.5% increase in bone mineral density of the hip       Frax index indicates a 5.9% risk of major osteoporotic fracture   over the next 10 years and 0.5% risk of hip fracture over the same   time frame       The T-value compares the patient's bone mineral density with the   peak bone mass of young normal patient's (average BMD of young   age, sex and race matched controls).  Accor ding to the WHO (World   Health Organization) criteria, patients with T-values between -1   and -2.5 have a low bone mass or density (osteopenia).  Patients with T-values less than -2.5 have osteoporosis.  The Z-value   compares the patient's bone mineral density with age and   sex-matched peers (average BMD of same age, sex and race matched   controls).          A T score below -2.5, especially in the presence of risk factors   in post menopausal women, indicates the need for treatment to   prevent fractures.  A T score below -1 within five years after   menopause or a Z score below -1 at any age indicates the need to   prevent further bone loss.  A Z score below -2 indicates   accelerated bone loss and suggests further studies to identify   possible major risk factors (2).          The risk of vertebral or hip fracture approximately doubles   (1.5-2.5) for each decrease of 1 standard deviation in T-score   (2).  Low bone density is not the only risk factor for fracture. Other risk factors are patient age, risk of falling, previous   osteoporotic fracture and family history of osteoporosis.          Serial examinations of bone mineral density are most cost   effective when performed every 12-24 months because of the   relatively slow rate of bone resorption and remodeling.  The   change in a patient's bone mineral density between the two exams   should be at least 5% to be meaningful (4.2-5.5% for the lumbar   spine, 5.5-8.5% for the hip)  (3).          References:        (1) International Society for Clinical Densitometry 2007. (2) [facility], [address] [place] [address]; [id number].     (3) TAYE Morrison  [employer]  2701; 62: 207-214.               EXAMINATION:   THYROID ULTRASOUND       7/23/2018       COMPARISON:   None.       HISTORY:   ORDERING PHYSICIAN PROVIDED HISTORY: Goiter   TECHNOLOGIST PROVIDED HISTORY:   Technologist Provided Reason for Exam: nodule   Acuity: Unknown   Type of Encounter: Unknown       FINDINGS:   Right thyroid lobe:  3.6 x 1.1 x 1.0 cm       Left thyroid lobe:  3.6 x 1.3 x 1.2 cm       Isthmus:  2 mm       Thyroid Gland:  Thyroid gland demonstrates normal echotexture and vascularity.       Nodules: No thyroid nodules are present.       Cervical lymphadenopathy: No abnormal lymph nodes in the imaged portions of   the neck.           Impression   Unremarkable thyroid ultrasound. Past Medical History:   Diagnosis Date    Bilateral closed proximal tibial stress fracture 8/6/2015    MRI dated 7/28/2015 shows a nondisplaced fracture of the medial proximal tibial metaphysis of both knees     Foot pain 10/13/2010    Pain in posterior tibial tendon- Dr. Kay Hsieh podiatry     Hypothyroidism     Menopausal state age 48    MVP (mitral valve prolapse)     last echo 9/2006 showed MVP otherwise nl-no regurg or stenosis of MV    Nondisplaced fracture of fifth metatarsal bone, left foot, initial encounter for closed fracture 1/11/2019 5/2018 in [facility]  In [address] [place] [address] for 10 weeks     Stress fracture of tibia 9/28/2017    Bilateral 2016-followed by Dr. Zena Elizalde Bilateral stress fractures!     Tear of medial meniscus of right knee 7/21/2015    Tendonitis, tibialis 10/13/2010     Patient Active Problem List    Diagnosis Date Noted    Pain of left clavicle 11/11/2020    IFG (impaired fasting glucose) 02/10/2020    Low back pain 10/08/2019    Vitamin D deficiency 08/08/2019    Adenomatous polyp 10/10/2017    Constipation 12/18/2015    Hypothyroidism     Osteopenia     Chondromalacia patellae of left knee 07/21/2015    Bilateral knee pain 07/14/2015    Fatigue 04/27/2015    Visual disturbance 03/29/2013    Ocular muscular dystrophy ([facility] [address] 03/29/2013    Headache 03/29/2013    Insomnia 11/07/2011    Anxiety 11/07/2011    PAC (premature atrial contraction) 01/09/2011    Family history of diabetes mellitus 10/13/2010    Hypertension 10/13/2010    Mitral valve prolapse 10/13/2010    Menopause 10/13/2010     Past Surgical History:   Procedure Laterality Date    BREAST ENHANCEMENT SURGERY Bilateral     91, 00, 04    COLONOSCOPY  2013 adenomatous polyp 2-13 for 5 yr marva    COLONOSCOPY  2/26/18  repeat x 5  yrs.  DILATION AND CURETTAGE OF UTERUS       Family History   Problem Relation Age of Onset    Thyroid Disease Mother     High Cholesterol Mother     Hypertension Mother     Other Father         Atherosclerotic Disease; Spastic paraparesis of legs     No Known Problems Brother     No Known Problems Son     No Known Problems Daughter      Social History     Socioeconomic History    Marital status:      Spouse name: None    Number of children: None    Years of education: None    Highest education level: None   Occupational History    None   Tobacco Use    Smoking status: Never Smoker    Smokeless tobacco: Never Used   Substance and Sexual Activity    Alcohol use: Not Currently     Comment: social    Drug use: No    Sexual activity: Yes     Partners: Male   Other Topics Concern    None   Social History Narrative    None     Social Determinants of Health     Financial Resource Strain:     Difficulty of Paying Living Expenses: Not on file   Food Insecurity:     Worried About Running Out of Food in the Last Year: Not on file    Luann of Food in the Last Year: Not on file   Transportation Needs:     Lack of Transportation (Medical): Not on file    Lack of Transportation (Non-Medical):  Not on file   Physical Activity:     Days of Exercise per Week: Not on file    Minutes of Exercise per Session: Not on file   Stress:     Feeling of Stress : Not on file   Social Connections:     Frequency of Communication with Friends and Family: Not on file    Frequency of Social Gatherings with Friends and Family: Not on file    Attends Catholic Services: Not on file    Active Member of Clubs or Organizations: Not on file    Attends Club or Organization Meetings: Not on file    Marital Status: Not on file   Intimate Partner Violence:     Fear of Current or Ex-Partner: Not on file    Emotionally Abused: Not on file   Sofia Dejesus Physically Abused: Not on file    Sexually Abused: Not on file   Housing Stability:     Unable to Pay for Housing in the Last Year: Not on file    Number of Places Lived in the Last Year: Not on file    Unstable Housing in the Last Year: Not on file     Current Outpatient Medications   Medication Sig Dispense Refill    levothyroxine (SYNTHROID) 75 MCG tablet 1 tablet 6 days a week, one and a half tablet 1 day a week 35 tablet 5    liothyronine (CYTOMEL) 5 MCG tablet Take 1 tablet by mouth daily tablets daily 30 tablet 5    nadolol (CORGARD) 20 MG tablet Take 1 tablet by mouth daily No further refills until seen 30 tablet 0    SF 1.1 % GEL USE AT BEDTIME. SPIT AFTER USE WITHOUT RINSING. DO NOT EAT OR DRINK FOR 30 MINUTES      diclofenac (VOLTAREN) 75 MG EC tablet Take 1 tablet by mouth 2 times daily Always with food 60 tablet 3    norethindrone-ethinyl estradiol (JINTELI) 1-5 MG-MCG TABS per tablet Take 1 tablet by mouth daily      omeprazole 20 MG EC tablet TAKE 1 TABLET BY MOUTH TWICE DAILY FOR 2 WEEKS (Patient not taking: Reported on 2022)      ANTACID 200-200-20 MG/5ML SUSP suspension SHAKE LIQUID AND TAKE 10 ML BY MOUTH FOUR TIMES DAILY AS NEEDED FOR INDIGESTION (Patient not taking: Reported on 2022)      citalopram (CELEXA) 20 MG tablet TAKE 1 TABLET BY MOUTH EVERY NIGHT AT BEDTIME (Patient not taking: Reported on 2022) 90 tablet 3     No current facility-administered medications for this visit. No Known Allergies  Family Status   Relation Name Status    Mother  Alive        HTN,chronic renal disease,xsmoker    Father          HTN-paraplegic after fastpitch injury age 13!     PGM          ASCAD/pancreatic ca    MGM          DM    PGF          alzheimers    MGF      Brother  Alive    Son  Alive    Ricardo  Alive       Review of Systems:  Constitutional: has fatigue, no fever, no recent weight gain, no recent weight loss, no changes in appetite, hard to lose weight  Eyes: no eye pain, has change in vision, no eye redness, has eye irritation, no double vision  Ears, nose, throat: no nasal congestion, no sore throat, no earache, no decrease in hearing, no hoarseness, no dry mouth, no sinus problems, no difficulty swallowing, no neck lumps, no dental problems, no mouth sores, no ringing in ears  Pulmonary: no shortness of breath, no wheezing, no dyspnea on exertion, no cough  Cardiovascular: no chest pain, no lower extremity edema, no orthopnea, no intermittent leg claudication, has palpitations  Gastrointestinal: no abdominal pain, no nausea, no vomiting, no diarrhea, no constipation, no dysphagia, no heartburn, no bloating  Genitourinary: no dysuria, no urinary incontinence, no urinary hesitancy, no urinary frequency, no feelings of urinary urgency, has nocturia  Musculoskeletal: no joint swelling, no joint stiffness, has joint pain, no muscle cramps, no muscle pain, no bone pain  Integument/Breast: no hair loss, no skin rashes, no skin lesions, no itching, no dry skin  Neurological: no numbness, no tingling, no weakness, no confusion, no headaches, no dizziness, no fainting, no tremors, no decrease in memory, no balance problems  Psychiatric: has anxiety, has depression, has insomnia  Hematologic/Lymphatic: no tendency for easy bleeding, no swollen lymph nodes, no tendency for easy bruising  Immunology: has seasonal allergies, no frequent infections, no frequent illnesses  Endocrine: no temperature intolerance, has hot flashes    /73   Pulse 54   Wt 185 lb (83.9 kg)   SpO2 99%   BMI 28.98 kg/m²    Wt Readings from Last 3 Encounters:   01/14/22 185 lb (83.9 kg)   09/28/21 179 lb 6.4 oz (81.4 kg)   06/23/21 175 lb (79.4 kg)     Body mass index is 28.98 kg/m².     OBJECTIVE:  Constitutional: no acute distress, well appearing and well nourished  Psychiatric: oriented to person, place and time, judgement and insight and normal, recent and remote memory and intact and mood and affect are normal  Skin: skin and subcutaneous tissue is normal without mass, normal turgor  Head and Face: examination of head and face revealed no abnormalities  Eyes: no lid or conjunctival swelling, erythema or discharge, pupils are normal, equal, round, reactive to light  Ears/Nose: external inspection of ears and nose revealed no abnormalities, hearing is grossly normal  Oropharynx/Mouth/Face: lips, tongue and gums are normal with no lesions, the voice quality was normal  Neck: neck is supple and symmetric, with midline trachea and no masses, thyroid is normal  Lymphatics: normal cervical lymph nodes, normal supraclavicular nodes  Pulmonary: no increased work of breathing or signs of respiratory distress, lungs are clear to auscultation  Cardiovascular: normal heart rate and rhythm, normal S1 and S2, no murmurs and pedal pulses and 2+ bilaterally, No edema  Abdomen: abdomen is soft, non-tender with no masses  Musculoskeletal: normal gait and station and exam of the digits and nails are normal  Neurological: normal coordination and normal general cortical function      Lab Review:    Lab Results   Component Value Date    WBC 5.8 01/06/2022    HGB 14.1 01/06/2022    HCT 44.6 01/06/2022    .5 01/06/2022     01/06/2022     Lab Results   Component Value Date     01/06/2022    K 4.5 01/06/2022     01/06/2022    CO2 25 01/06/2022    BUN 16 01/06/2022    CREATININE 1.0 01/06/2022    GLUCOSE 94 01/06/2022    GLUCOSE 76 12/14/2011    CALCIUM 9.4 01/06/2022    PROT 6.7 01/06/2022    PROT 6.9 02/22/2013    LABALBU 4.1 01/06/2022    BILITOT 0.3 01/06/2022    ALKPHOS 63 01/06/2022    AST 21 01/06/2022    ALT 15 01/06/2022    LABGLOM 57 01/06/2022    LABGLOM 64 10/24/2013    GFRAA >60 01/06/2022    AGRATIO 1.6 01/06/2022    GLOB 2.7 09/10/2021     Lab Results   Component Value Date    TSH 1.56 01/06/2022    FT3 3.3 01/06/2022     Lab Results   Component Value treatment, labs, imaging and other studies and treatment targets and goals. She understands instructions and counseling. Return in about 6 months (around 7/14/2022) for thyroid problems.

## 2022-01-19 RX ORDER — NADOLOL 20 MG/1
20 TABLET ORAL DAILY
Qty: 90 TABLET | Refills: 0 | Status: SHIPPED | OUTPATIENT
Start: 2022-01-19 | End: 2022-07-19 | Stop reason: SDUPTHER

## 2022-01-19 NOTE — TELEPHONE ENCOUNTER
Pharmacy called in regarding directions for med    nadolol (CORGARD) 20 MG tablet       Dose: 90 mg Route: Oral Frequency: DAILY   Dispense Quantity: 90 tablet Refills: 3    Note to Pharmacy: No further refills until seen! !         Sig: Take 4.5 tablets by mouth daily No further refills until seen         They said the patient was taking 1 pill per day . they just want to make sure directions were correct     Please advise and call

## 2022-04-24 SDOH — HEALTH STABILITY: PHYSICAL HEALTH: ON AVERAGE, HOW MANY DAYS PER WEEK DO YOU ENGAGE IN MODERATE TO STRENUOUS EXERCISE (LIKE A BRISK WALK)?: 4 DAYS

## 2022-04-24 SDOH — HEALTH STABILITY: PHYSICAL HEALTH: ON AVERAGE, HOW MANY MINUTES DO YOU ENGAGE IN EXERCISE AT THIS LEVEL?: 40 MIN

## 2022-04-24 ASSESSMENT — SOCIAL DETERMINANTS OF HEALTH (SDOH)
WITHIN THE LAST YEAR, HAVE YOU BEEN AFRAID OF YOUR PARTNER OR EX-PARTNER?: NO
WITHIN THE LAST YEAR, HAVE YOU BEEN KICKED, HIT, SLAPPED, OR OTHERWISE PHYSICALLY HURT BY YOUR PARTNER OR EX-PARTNER?: NO
WITHIN THE LAST YEAR, HAVE YOU BEEN HUMILIATED OR EMOTIONALLY ABUSED IN OTHER WAYS BY YOUR PARTNER OR EX-PARTNER?: NO
WITHIN THE LAST YEAR, HAVE TO BEEN RAPED OR FORCED TO HAVE ANY KIND OF SEXUAL ACTIVITY BY YOUR PARTNER OR EX-PARTNER?: NO

## 2022-04-24 ASSESSMENT — PATIENT HEALTH QUESTIONNAIRE - PHQ9
SUM OF ALL RESPONSES TO PHQ QUESTIONS 1-9: 0
SUM OF ALL RESPONSES TO PHQ QUESTIONS 1-9: 0
1. LITTLE INTEREST OR PLEASURE IN DOING THINGS: 0
SUM OF ALL RESPONSES TO PHQ9 QUESTIONS 1 & 2: 0
2. FEELING DOWN, DEPRESSED OR HOPELESS: NOT AT ALL
2. FEELING DOWN, DEPRESSED OR HOPELESS: 0
SUM OF ALL RESPONSES TO PHQ QUESTIONS 1-9: 0
SUM OF ALL RESPONSES TO PHQ9 QUESTIONS 1 & 2: 0
SUM OF ALL RESPONSES TO PHQ QUESTIONS 1-9: 0
1. LITTLE INTEREST OR PLEASURE IN DOING THINGS: NOT AT ALL

## 2022-04-25 ENCOUNTER — OFFICE VISIT (OUTPATIENT)
Dept: FAMILY MEDICINE CLINIC | Age: 62
End: 2022-04-25
Payer: COMMERCIAL

## 2022-04-25 VITALS
OXYGEN SATURATION: 98 % | WEIGHT: 181 LBS | HEIGHT: 67 IN | HEART RATE: 54 BPM | SYSTOLIC BLOOD PRESSURE: 100 MMHG | BODY MASS INDEX: 28.41 KG/M2 | DIASTOLIC BLOOD PRESSURE: 72 MMHG

## 2022-04-25 DIAGNOSIS — F51.01 PRIMARY INSOMNIA: Primary | ICD-10-CM

## 2022-04-25 DIAGNOSIS — M54.50 ACUTE LEFT-SIDED LOW BACK PAIN WITHOUT SCIATICA: ICD-10-CM

## 2022-04-25 PROBLEM — H17.9 CORNEAL SCAR, RIGHT EYE: Status: ACTIVE | Noted: 2017-08-20

## 2022-04-25 PROBLEM — H52.10 MYOPIA: Status: ACTIVE | Noted: 2017-08-20

## 2022-04-25 PROBLEM — H50.21 HYPERTROPIA OF RIGHT EYE: Status: ACTIVE | Noted: 2017-08-20

## 2022-04-25 PROBLEM — H51.12 CONVERGENCE EXCESS: Status: ACTIVE | Noted: 2017-08-20

## 2022-04-25 PROBLEM — H49.10 4TH NERVE PALSY: Status: ACTIVE | Noted: 2017-08-20

## 2022-04-25 PROBLEM — H52.223 REGULAR ASTIGMATISM OF BOTH EYES: Status: ACTIVE | Noted: 2017-08-20

## 2022-04-25 PROBLEM — H52.4 PRESBYOPIA: Status: ACTIVE | Noted: 2017-08-20

## 2022-04-25 PROBLEM — H51.8 INFERIOR OBLIQUE OVERACTION: Status: ACTIVE | Noted: 2017-08-20

## 2022-04-25 PROCEDURE — 99214 OFFICE O/P EST MOD 30 MIN: CPT | Performed by: FAMILY MEDICINE

## 2022-04-25 RX ORDER — TRAZODONE HYDROCHLORIDE 50 MG/1
50 TABLET ORAL NIGHTLY PRN
Qty: 30 TABLET | Refills: 0 | Status: SHIPPED | OUTPATIENT
Start: 2022-04-25 | End: 2022-05-26

## 2022-04-25 RX ORDER — CELECOXIB 200 MG/1
200 CAPSULE ORAL DAILY
Qty: 60 CAPSULE | Refills: 3 | Status: SHIPPED | OUTPATIENT
Start: 2022-04-25

## 2022-04-25 NOTE — PROGRESS NOTES
Caryl Mendez (:  1960) is a 63 y.o. female,Established patient, here for evaluation of the following chief complaint(s):  New Patient (Patient needs to establish with new provider. Complaints: flat feet, difficulty falling and staying asleep and lower back pain (\"twinge\" with certain movements). ) and Immunizations (shingrix)         ASSESSMENT/PLAN:  Yumiko Trevino was seen today for new patient and immunizations. Diagnoses and all orders for this visit:    Primary insomnia  Not well controlled. Trial of trazadone  Medication side affects and adverse reactions reviewed. Acute left-sided low back pain without sciatica  Not well controlled  Reviewed HEP  Trial of celebrex  Medication side affects and adverse reactions reviewed. Other orders  -     traZODone (DESYREL) 50 MG tablet; Take 1 tablet by mouth nightly as needed for Sleep  -     celecoxib (CELEBREX) 200 MG capsule; Take 1 capsule by mouth daily         No follow-ups on file. Subjective   SUBJECTIVE/OBJECTIVE:  HPI   Pt is a of 63 y.o. female comes in today with   Chief Complaint   Patient presents with    New Patient     Patient needs to establish with new provider. Complaints: flat feet, difficulty falling and staying asleep and lower back pain (\"twinge\" with certain movements).  Immunizations     shingrix     Previous MD retiring. Following with Dr. Vasquez Lester 81 for hypothyroidism. Exercises regularly. [facility] Does cardio. LMP 10 years ago. Sees gyn for HRT. Nadolol nightly for PACs. Worse at night. Mood good most days. Was on celexa for a few years but weaned off. Struggled with insomnia for years. Has tried otc, teas. advil pm helps a little but has symptoms in the am.  No caffeine other than am.    Struggles with flat feet.   Uses orthotics    Vitals:    22 1354   BP: 100/72   Site: Left Upper Arm   Position: Sitting   Cuff Size: Small Adult   Pulse: 54   SpO2: 98%   Weight: 181 lb (82.1 kg) Height: 5' 7\" (1.702 m)        Review of Systems     No wt change. Mood good. Objective   Physical Exam         An electronic signature was used to authenticate this note.     --Alex Johnson MD

## 2022-05-01 PROBLEM — M89.8X1 PAIN OF LEFT CLAVICLE: Status: RESOLVED | Noted: 2020-11-11 | Resolved: 2022-05-01

## 2022-05-25 NOTE — TELEPHONE ENCOUNTER
Medication:   Requested Prescriptions     Pending Prescriptions Disp Refills    traZODone (DESYREL) 50 MG tablet [Pharmacy Med Name: TRAZODONE 50MG TABLETS] 30 tablet 0     Sig: TAKE 1 TABLET BY MOUTH EVERY NIGHT AS NEEDED FOR SLEEP        Last Filled:  4/25/22    Patient Phone Number: [phone] (home)     Last appt: 4/25/2022   Next appt: Visit date not found    Last OARRS: No flowsheet data found.

## 2022-05-26 RX ORDER — TRAZODONE HYDROCHLORIDE 50 MG/1
TABLET ORAL
Qty: 30 TABLET | Refills: 0 | Status: SHIPPED | OUTPATIENT
Start: 2022-05-26 | End: 2022-07-15 | Stop reason: ALTCHOICE

## 2022-06-24 ENCOUNTER — HOSPITAL ENCOUNTER (OUTPATIENT)
Dept: MAMMOGRAPHY | Age: 62
Discharge: HOME | End: 2022-06-24
Payer: COMMERCIAL

## 2022-06-24 VITALS — BODY MASS INDEX: 27 KG/M2 | WEIGHT: 172 LBS | HEIGHT: 67 IN

## 2022-06-24 DIAGNOSIS — Z12.31 BREAST CANCER SCREENING BY MAMMOGRAM: ICD-10-CM

## 2022-06-24 PROCEDURE — 77067 SCR MAMMO BI INCL CAD: CPT

## 2022-07-08 DIAGNOSIS — E55.9 VITAMIN D DEFICIENCY: ICD-10-CM

## 2022-07-08 DIAGNOSIS — E03.9 ACQUIRED HYPOTHYROIDISM: ICD-10-CM

## 2022-07-08 DIAGNOSIS — R73.01 IFG (IMPAIRED FASTING GLUCOSE): ICD-10-CM

## 2022-07-08 DIAGNOSIS — M85.80 OSTEOPENIA, UNSPECIFIED LOCATION: ICD-10-CM

## 2022-07-08 DIAGNOSIS — Z78.0 MENOPAUSE: ICD-10-CM

## 2022-07-08 LAB
A/G RATIO: 1.8 (ref 1.1–2.2)
ALBUMIN SERPL-MCNC: 4.2 G/DL (ref 3.4–5)
ALP BLD-CCNC: 65 U/L (ref 40–129)
ALT SERPL-CCNC: 12 U/L (ref 10–40)
ANION GAP SERPL CALCULATED.3IONS-SCNC: 12 MMOL/L (ref 3–16)
AST SERPL-CCNC: 15 U/L (ref 15–37)
BILIRUB SERPL-MCNC: 0.5 MG/DL (ref 0–1)
BUN BLDV-MCNC: 12 MG/DL (ref 7–20)
CALCIUM SERPL-MCNC: 9.4 MG/DL (ref 8.3–10.6)
CHLORIDE BLD-SCNC: 108 MMOL/L (ref 99–110)
CO2: 22 MMOL/L (ref 21–32)
CREAT SERPL-MCNC: 1 MG/DL (ref 0.6–1.1)
GFR AFRICAN AMERICAN: >60
GFR NON-AFRICAN AMERICAN: 57
GLUCOSE BLD-MCNC: 93 MG/DL (ref 70–99)
HCT VFR BLD CALC: 42.1 % (ref 36–48)
HEMOGLOBIN: 14.4 G/DL (ref 12–16)
MCH RBC QN AUTO: 33 PG (ref 26–34)
MCHC RBC AUTO-ENTMCNC: 34.3 G/DL (ref 31–36)
MCV RBC AUTO: 96.4 FL (ref 80–100)
PDW BLD-RTO: 12.1 % (ref 12.4–15.4)
PLATELET # BLD: 241 K/UL (ref 135–450)
PMV BLD AUTO: 9.7 FL (ref 5–10.5)
POTASSIUM SERPL-SCNC: 4.4 MMOL/L (ref 3.5–5.1)
RBC # BLD: 4.37 M/UL (ref 4–5.2)
SODIUM BLD-SCNC: 142 MMOL/L (ref 136–145)
T3 FREE: 3.7 PG/ML (ref 2.3–4.2)
T4 FREE: 1.5 NG/DL (ref 0.9–1.8)
TOTAL PROTEIN: 6.5 G/DL (ref 6.4–8.2)
TSH SERPL DL<=0.05 MIU/L-ACNC: 1.7 UIU/ML (ref 0.27–4.2)
VITAMIN D 25-HYDROXY: 42.7 NG/ML
WBC # BLD: 4.8 K/UL (ref 4–11)

## 2022-07-12 DIAGNOSIS — E03.9 ACQUIRED HYPOTHYROIDISM: ICD-10-CM

## 2022-07-12 RX ORDER — LIOTHYRONINE SODIUM 5 UG/1
TABLET ORAL
Qty: 30 TABLET | Refills: 0 | Status: SHIPPED | OUTPATIENT
Start: 2022-07-12 | End: 2022-07-15 | Stop reason: SDUPTHER

## 2022-07-12 RX ORDER — LEVOTHYROXINE SODIUM 0.07 MG/1
TABLET ORAL
Qty: 35 TABLET | Refills: 0 | Status: SHIPPED | OUTPATIENT
Start: 2022-07-12 | End: 2022-07-15 | Stop reason: SDUPTHER

## 2022-07-15 ENCOUNTER — OFFICE VISIT (OUTPATIENT)
Dept: ENDOCRINOLOGY | Age: 62
End: 2022-07-15
Payer: COMMERCIAL

## 2022-07-15 VITALS
DIASTOLIC BLOOD PRESSURE: 78 MMHG | BODY MASS INDEX: 27.47 KG/M2 | TEMPERATURE: 98 F | WEIGHT: 175 LBS | HEIGHT: 67 IN | OXYGEN SATURATION: 98 % | SYSTOLIC BLOOD PRESSURE: 148 MMHG | HEART RATE: 53 BPM | RESPIRATION RATE: 14 BRPM

## 2022-07-15 DIAGNOSIS — M85.80 OSTEOPENIA, UNSPECIFIED LOCATION: ICD-10-CM

## 2022-07-15 DIAGNOSIS — Z78.0 MENOPAUSE: ICD-10-CM

## 2022-07-15 DIAGNOSIS — E03.9 ACQUIRED HYPOTHYROIDISM: Primary | ICD-10-CM

## 2022-07-15 DIAGNOSIS — E55.9 VITAMIN D DEFICIENCY: ICD-10-CM

## 2022-07-15 DIAGNOSIS — R73.01 IFG (IMPAIRED FASTING GLUCOSE): ICD-10-CM

## 2022-07-15 PROCEDURE — 99214 OFFICE O/P EST MOD 30 MIN: CPT | Performed by: INTERNAL MEDICINE

## 2022-07-15 RX ORDER — LEVOTHYROXINE SODIUM 0.07 MG/1
TABLET ORAL
Qty: 35 TABLET | Refills: 5 | Status: SHIPPED | OUTPATIENT
Start: 2022-07-15

## 2022-07-15 RX ORDER — LIOTHYRONINE SODIUM 5 UG/1
TABLET ORAL
Qty: 30 TABLET | Refills: 5 | Status: SHIPPED | OUTPATIENT
Start: 2022-07-15

## 2022-07-15 NOTE — PROGRESS NOTES
SUBJECTIVE:  Tyrel See is a 63 y.o. female who is here for hypothyroidism. 1. Acquired hypothyroidism    This started in 2010. Patient was diagnosed with hypothyroidism. The problem has been unchanged. Patient started medication in 2015. Currently patient is on: levothyroxine, liothyronide. Misses  0 doses a month. Current complaints:  fatigue  Has a lot of stress at work. Exercises, eating better    History of obstructive symptoms: difficulty swallowing No, changes in voice/hoarseness No.  History of radiation to patient's neck: No  Resent iodine exposure: No  Family history includes hypothyroidism, goiter  Family history of thyroid cancer: No    2. Menopause  Periods stopped at age 50. No hot flashes on estrogen. 3. Osteopenia, unspecified location  Had stress fractures in tibula. Healed recent 5th metatarsal left leg fracture. No bone pain. 4.  Vitamin D deficiency  No bone pain. 5. IFG   Has family Hx of diabetes    BONE DENSITOMETRY (Dexa)        HISTORY: Postmenopausal estrogen deficiency            Impression   IMPRESSION:        Normal mineralization of the lumbar spine with T. evaluate -0.9. There is mild osteopenia of the L4 segment with T. evaluate -1.3       Osteopenia of the total hip and femoral neck with T. diagnosis of   -1.2 and -1.6, respectively       Comparison with 2010 shows 5.4% increase in bone mineral density   of the spine and 2.5% increase in bone mineral density of the hip       Frax index indicates a 5.9% risk of major osteoporotic fracture   over the next 10 years and 0.5% risk of hip fracture over the same   time frame       The T-value compares the patient's bone mineral density with the   peak bone mass of young normal patient's (average BMD of young   age, sex and race matched controls). Accor ding to the WHO (World   Health Organization) criteria, patients with T-values between -1   and -2.5 have a low bone mass or density (osteopenia).   Patients with T-values less than -2.5 have osteoporosis. The Z-value   compares the patient's bone mineral density with age and   sex-matched peers (average BMD of same age, sex and race matched   controls). A T score below -2.5, especially in the presence of risk factors   in post menopausal women, indicates the need for treatment to   prevent fractures. A T score below -1 within five years after   menopause or a Z score below -1 at any age indicates the need to   prevent further bone loss. A Z score below -2 indicates   accelerated bone loss and suggests further studies to identify   possible major risk factors (2). The risk of vertebral or hip fracture approximately doubles   (1.5-2.5) for each decrease of 1 standard deviation in T-score   (2). Low bone density is not the only risk factor for fracture. Other risk factors are patient age, risk of falling, previous   osteoporotic fracture and family history of osteoporosis. Serial examinations of bone mineral density are most cost   effective when performed every 12-24 months because of the   relatively slow rate of bone resorption and remodeling. The   change in a patient's bone mineral density between the two exams   should be at least 5% to be meaningful (4.2-5.5% for the lumbar   spine, 5.5-8.5% for the hip)  (3). References:        (1) International Society for Clinical Densitometry 2007. (2) Jamison Owens; [id number]. (3) MASON Morrison. [address]; 62: 207-214. EXAMINATION:   THYROID ULTRASOUND       7/23/2018       COMPARISON:   None.        HISTORY:   ORDERING PHYSICIAN PROVIDED HISTORY: Goiter   TECHNOLOGIST PROVIDED HISTORY:   Technologist Provided Reason for Exam: nodule   Acuity: Unknown   Type of Encounter: Unknown       FINDINGS:   Right thyroid lobe:  3.6 x 1.1 x 1.0 cm       Left thyroid lobe:  3.6 x 1.3 x 1.2 cm       Isthmus:  2 mm       Thyroid Gland:  Thyroid gland demonstrates normal echotexture and vascularity. Nodules: No thyroid nodules are present. Cervical lymphadenopathy: No abnormal lymph nodes in the imaged portions of   the neck. Impression   Unremarkable thyroid ultrasound. Past Medical History:   Diagnosis Date    Bilateral closed proximal tibial stress fracture 8/6/2015    MRI dated 7/28/2015 shows a nondisplaced fracture of the medial proximal tibial metaphysis of both knees     Chondromalacia patellae of left knee 7/21/2015    Foot pain 10/13/2010    Pain in posterior tibial tendon- Dr. Colleen aMckay podiatry     Hypothyroidism     Menopausal state age 48    MVP (mitral valve prolapse)     last echo 9/2006 showed MVP otherwise nl-no regurg or stenosis of MV    Nondisplaced fracture of fifth metatarsal bone, left foot, initial encounter for closed fracture 1/11/2019 5/2018 in [facility]  In [address] [place] [address] for 10 weeks     Stress fracture of tibia 9/28/2017    Bilateral 2016-followed by Dr. Mirna Galeana Bilateral stress fractures!     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    Fuchs' corneal dystrophy 06/06/2016    Contact dermatitis and other eczema, due to unspecified cause 02/18/2010    Palpitations 12/12/2008    IFG (impaired fasting glucose) 02/10/2020    Low back pain 10/08/2019    Vitamin D deficiency 08/08/2019    Adenomatous polyp 10/10/2017    Constipation 12/18/2015    Hypothyroidism     Osteopenia     Bilateral knee pain 07/14/2015    Ocular muscular dystrophy ([facility] [address] 03/29/2013    Headache 03/29/2013    Insomnia 11/07/2011    Anxiety 11/07/2011    PAC (premature atrial contraction) 01/09/2011    Family history of diabetes mellitus 10/13/2010    Hypertension 10/13/2010    Mitral valve prolapse 10/13/2010    Menopause 10/13/2010     Past Surgical History:   Procedure Laterality Date    BREAST ENHANCEMENT SURGERY Bilateral     91, 00, 04    COLONOSCOPY  2013    adenomatous polyp 2-13 for 5 yr marva    COLONOSCOPY  2/26/18  repeat x 5  yrs.     DILATION AND CURETTAGE OF UTERUS       Family History   Problem Relation Age of Onset    Thyroid Disease Mother     High Cholesterol Mother     Hypertension Mother     Other Father         Atherosclerotic Disease; Spastic paraparesis of legs     No Known Problems Brother     No Known Problems Son     No Known Problems Daughter      Social History     Socioeconomic History    Marital status:      Spouse name: None    Number of children: None    Years of education: None    Highest education level: None   Tobacco Use    Smoking status: Never    Smokeless tobacco: Never   Substance and Sexual Activity    Alcohol use: Not Currently     Comment: social    Drug use: No    Sexual activity: Yes     Partners: Male     Social Determinants of Health     Financial Resource Strain: Low Risk     Difficulty of Paying Living Expenses: Not hard at all   Food Insecurity: No Food Insecurity    Worried About Running Out of Food in the Last Year: Never true    Ran Out of Food in the Last Year: Never true   Physical Activity: Sufficiently Active    Days of Exercise per Week: 4 days    Minutes of Exercise per Session: 40 min   Intimate Partner Violence: Not At Risk    Fear of Current or Ex-Partner: No    Emotionally Abused: No    Physically Abused: No    Sexually Abused: No     Current Outpatient Medications   Medication Sig Dispense Refill    levothyroxine (SYNTHROID) 75 MCG tablet TAKE 1 TABLET BY MOUTH 6 DAYS A WEEK, THEN 1 1/2 TABLETS BY MOUTH 1 DAY A WEEK 35 tablet 5    liothyronine (CYTOMEL) 5 MCG tablet TAKE 1 TABLET BY MOUTH DAILY 30 tablet 5 has nocturia  Musculoskeletal: no joint swelling, no joint stiffness, has joint pain, no muscle cramps, no muscle pain, no bone pain  Integument/Breast: no hair loss, no skin rashes, no skin lesions, no itching, no dry skin  Neurological: no numbness, no tingling, no weakness, no confusion, no headaches, no dizziness, no fainting, no tremors, no decrease in memory, no balance problems  Psychiatric: has anxiety, has depression, has insomnia  Hematologic/Lymphatic: no tendency for easy bleeding, no swollen lymph nodes, no tendency for easy bruising  Immunology: has seasonal allergies, no frequent infections, no frequent illnesses  Endocrine: no temperature intolerance, has hot flashes    BP (!) 148/78   Pulse 53   Temp 98 °F (36.7 °C)   Resp 14   Ht 5' 7\" (1.702 m)   Wt 175 lb (79.4 kg)   SpO2 98%   BMI 27.41 kg/m²    Wt Readings from Last 3 Encounters:   07/15/22 175 lb (79.4 kg)   06/24/22 172 lb (78 kg)   04/25/22 181 lb (82.1 kg)     Body mass index is 27.41 kg/m².     OBJECTIVE:  Constitutional: no acute distress, well appearing and well nourished  Psychiatric: oriented to person, place and time, judgement and insight and normal, recent and remote memory and intact and mood and affect are normal  Skin: skin and subcutaneous tissue is normal without mass, normal turgor  Head and Face: examination of head and face revealed no abnormalities  Eyes: no lid or conjunctival swelling, erythema or discharge, pupils are normal, equal, round, reactive to light  Ears/Nose: external inspection of ears and nose revealed no abnormalities, hearing is grossly normal  Oropharynx/Mouth/Face: lips, tongue and gums are normal with no lesions, the voice quality was normal  Neck: neck is supple and symmetric, with midline trachea and no masses, thyroid is normal  Lymphatics: normal cervical lymph nodes, normal supraclavicular nodes  Pulmonary: no increased work of breathing or signs of respiratory distress, lungs are clear to

## 2022-07-19 RX ORDER — NADOLOL 20 MG/1
20 TABLET ORAL DAILY
Qty: 90 TABLET | Refills: 0 | Status: SHIPPED | OUTPATIENT
Start: 2022-07-19 | End: 2022-10-10

## 2022-07-19 NOTE — TELEPHONE ENCOUNTER
Medication:   Requested Prescriptions     Pending Prescriptions Disp Refills    nadolol (CORGARD) 20 MG tablet 90 tablet 0     Sig: Take 1 tablet by mouth in the morning.         Last Filled: 1/19/2022   Last appt: 4/25/2022 (new patient)  Next appt: none

## 2022-07-22 RX ORDER — NADOLOL 20 MG/1
20 TABLET ORAL DAILY
Qty: 90 TABLET | Refills: 0 | OUTPATIENT
Start: 2022-07-22 | End: 2022-08-21

## 2022-10-10 RX ORDER — NADOLOL 20 MG/1
20 TABLET ORAL DAILY
Qty: 90 TABLET | Refills: 0 | Status: SHIPPED | OUTPATIENT
Start: 2022-10-10 | End: 2022-11-09

## 2022-10-10 NOTE — TELEPHONE ENCOUNTER
Medication:   Requested Prescriptions     Pending Prescriptions Disp Refills    nadolol (CORGARD) 20 MG tablet [Pharmacy Med Name: NADOLOL 20MG TABLETS] 90 tablet 0     Sig: TAKE 1 TABLET BY MOUTH IN THE MORNING        Last Filled: 7/19/2022   Last appt: 4/25/2022   Next appt: NONE
 used

## 2022-11-29 ENCOUNTER — HOSPITAL ENCOUNTER (OUTPATIENT)
Dept: GENERAL RADIOLOGY | Age: 62
Discharge: HOME | End: 2022-11-29
Payer: COMMERCIAL

## 2022-11-29 DIAGNOSIS — M85.80 OSTEOPENIA, UNSPECIFIED LOCATION: ICD-10-CM

## 2022-11-29 DIAGNOSIS — Z78.0 MENOPAUSE: ICD-10-CM

## 2022-11-29 PROCEDURE — 77080 DXA BONE DENSITY AXIAL: CPT

## 2022-12-07 ENCOUNTER — TELEPHONE (OUTPATIENT)
Dept: FAMILY MEDICINE CLINIC | Age: 62
End: 2022-12-07

## 2022-12-07 NOTE — TELEPHONE ENCOUNTER
Patient was transferred for triage due to L shoulder pain x 3-4 weeks. Patient scheduled with RERE Vance tomorrow afternoon.

## 2022-12-08 ENCOUNTER — OFFICE VISIT (OUTPATIENT)
Dept: FAMILY MEDICINE CLINIC | Age: 62
End: 2022-12-08
Payer: COMMERCIAL

## 2022-12-08 VITALS
WEIGHT: 176 LBS | HEIGHT: 67 IN | HEART RATE: 55 BPM | BODY MASS INDEX: 27.62 KG/M2 | SYSTOLIC BLOOD PRESSURE: 122 MMHG | OXYGEN SATURATION: 99 % | DIASTOLIC BLOOD PRESSURE: 80 MMHG

## 2022-12-08 DIAGNOSIS — M25.512 ACUTE PAIN OF LEFT SHOULDER: Primary | ICD-10-CM

## 2022-12-08 PROCEDURE — 3074F SYST BP LT 130 MM HG: CPT | Performed by: NURSE PRACTITIONER

## 2022-12-08 PROCEDURE — 3078F DIAST BP <80 MM HG: CPT | Performed by: NURSE PRACTITIONER

## 2022-12-08 PROCEDURE — 99213 OFFICE O/P EST LOW 20 MIN: CPT | Performed by: NURSE PRACTITIONER

## 2022-12-08 RX ORDER — NAPROXEN 250 MG/1
250 TABLET ORAL 2 TIMES DAILY PRN
Qty: 14 TABLET | Refills: 0 | Status: SHIPPED | OUTPATIENT
Start: 2022-12-08 | End: 2022-12-15

## 2022-12-08 ASSESSMENT — ENCOUNTER SYMPTOMS
EYE ITCHING: 0
COLOR CHANGE: 0
COUGH: 0
EYE DISCHARGE: 0
VOICE CHANGE: 0
ABDOMINAL PAIN: 0
CHEST TIGHTNESS: 0
BACK PAIN: 0
BLOOD IN STOOL: 0
EYE REDNESS: 0
VOMITING: 0
CONSTIPATION: 0
STRIDOR: 0
SORE THROAT: 0
NAUSEA: 0
SINUS PRESSURE: 0
EYE PAIN: 0
CHOKING: 0
DIARRHEA: 0
TROUBLE SWALLOWING: 0
SHORTNESS OF BREATH: 0
WHEEZING: 0
RHINORRHEA: 0
SINUS PAIN: 0
PHOTOPHOBIA: 0

## 2022-12-08 NOTE — PROGRESS NOTES
Chief Complaint   Patient presents with    Shoulder Pain     4 weeks left       /80 (Site: Right Upper Arm, Position: Sitting, Cuff Size: Medium Adult)   Pulse 55   Ht 5' 7\" (1.702 m)   Wt 176 lb (79.8 kg)   SpO2 99%   BMI 27.57 kg/m²     HPI:  Tomy Terry is a 63 y.o. (: 1960) here today   for   HPI    Patient's medications, allergies, past medical, surgical, social and family histories were reviewed and updated as appropriate. Left shoulder pain: Started a month ago. Denies trauma. Hurts when taking off sports bra off. Denies numbness and tingling, right handed. Denies weakness. Crossing over and lifting hurt. Extension to get car door. Overhead presses hurt. Denies bruising. She has tried limiting movement. She has tried naproxen, helped some. Does not wake her up at night. Pain 2/10, more annoying. Pain is more ache. ROS:  Review of Systems   Constitutional:  Positive for activity change. Negative for appetite change, chills, diaphoresis, fatigue, fever and unexpected weight change. HENT:  Negative for congestion, ear discharge, ear pain, hearing loss, nosebleeds, postnasal drip, rhinorrhea, sinus pressure, sinus pain, sneezing, sore throat, tinnitus, trouble swallowing and voice change. Eyes:  Negative for photophobia, pain, discharge, redness and itching. Respiratory:  Negative for cough, choking, chest tightness, shortness of breath, wheezing and stridor. Cardiovascular:  Negative for chest pain, palpitations and leg swelling. Gastrointestinal:  Negative for abdominal pain, blood in stool, constipation, diarrhea, nausea and vomiting. Endocrine: Negative for cold intolerance, heat intolerance, polydipsia and polyuria. Genitourinary:  Negative for difficulty urinating, dysuria, enuresis, flank pain, frequency, hematuria and urgency. Musculoskeletal:  Negative for back pain, gait problem, joint swelling, neck pain and neck stiffness.         Left shoulder pain Skin:  Negative for color change, pallor, rash and wound. Allergic/Immunologic: Negative for environmental allergies and food allergies. Neurological:  Negative for dizziness, tremors, syncope, speech difficulty, weakness, light-headedness, numbness and headaches. Hematological:  Negative for adenopathy. Does not bruise/bleed easily. Psychiatric/Behavioral:  Negative for agitation, behavioral problems, confusion, decreased concentration, dysphoric mood, hallucinations, self-injury, sleep disturbance and suicidal ideas. The patient is not nervous/anxious and is not hyperactive. Prior to Visit Medications    Medication Sig Taking? Authorizing Provider   naproxen (NAPROSYN) 250 MG tablet Take 1 tablet by mouth 2 times daily as needed for Pain Yes RODNEY Bryan CNP   nadolol (CORGARD) 20 MG tablet TAKE 1 TABLET BY MOUTH IN THE MORNING  Heather Gallo MD   levothyroxine (SYNTHROID) 75 MCG tablet TAKE 1 TABLET BY MOUTH 6 DAYS A WEEK, THEN 1 1/2 TABLETS BY MOUTH 1 DAY A WEEK Yes Kimmy Arreguin MD   liothyronine (CYTOMEL) 5 MCG tablet TAKE 1 TABLET BY MOUTH DAILY Yes Kimmy Arreguin MD   omeprazole 20 MG EC tablet TAKE 1 TABLET BY MOUTH TWICE DAILY FOR 2 WEEKS Yes Historical Provider, MD   norethindrone-ethinyl estradiol (JINTELI) 1-5 MG-MCG TABS per tablet Take 1 tablet by mouth daily Yes Historical Provider, MD   ANTACID [id number] MG/5ML SUSP suspension SHAKE LIQUID AND TAKE 10 ML BY MOUTH FOUR TIMES DAILY AS NEEDED FOR INDIGESTION  Patient not taking: No sig reported  Historical Provider, MD       No Known Allergies    OBJECTIVE:      BP Readings from Last 2 Encounters:   12/08/22 122/80   07/15/22 (!) 148/78       Wt Readings from Last 3 Encounters:   12/08/22 176 lb (79.8 kg)   07/15/22 175 lb (79.4 kg)   06/24/22 172 lb (78 kg)       Physical Exam  Vitals reviewed. Constitutional:       General: She is not in acute distress. Appearance: Normal appearance. She is well-developed. HENT:      Head: Normocephalic and atraumatic. Right Ear: Hearing and external ear normal.      Left Ear: Hearing and external ear normal.      Nose:      Right Sinus: No maxillary sinus tenderness or frontal sinus tenderness. Left Sinus: No maxillary sinus tenderness or frontal sinus tenderness. Eyes:      General:         Right eye: No discharge. Left eye: No discharge. Conjunctiva/sclera: Conjunctivae normal.   Neck:      Thyroid: No thyromegaly. Vascular: No JVD. Trachea: No tracheal deviation. Cardiovascular:      Rate and Rhythm: Normal rate and regular rhythm. Heart sounds: Normal heart sounds. No murmur heard. No friction rub. Pulmonary:      Effort: Pulmonary effort is normal. No respiratory distress. Breath sounds: Normal breath sounds. No stridor. No decreased breath sounds, wheezing, rhonchi or rales. Musculoskeletal:         General: No tenderness. Normal range of motion. Arms:       Cervical back: Normal range of motion. Comments: Full rom. Can open shoulder, put hand above head, abduct arm but does cause pain, pain with taking off sports bra. Strength equal intact bilaterally. Lymphadenopathy:      Cervical: No cervical adenopathy. Skin:     General: Skin is warm and dry. Capillary Refill: Capillary refill takes less than 2 seconds. Findings: No rash. Neurological:      Mental Status: She is alert and oriented to person, place, and time. Sensory: Sensation is intact. Motor: Motor function is intact. Coordination: Coordination normal.   Psychiatric:         Attention and Perception: Attention and perception normal.         Mood and Affect: Mood normal.         Speech: Speech normal.         Behavior: Behavior normal. Behavior is cooperative. Thought Content: Thought content normal.         Cognition and Memory: Cognition normal.         Judgment: Judgment normal.       ASSESSMENT/PLAN:    1. Acute pain of left shoulder    - naproxen (NAPROSYN) 250 MG tablet; Take 1 tablet by mouth 2 times daily as needed for Pain  Dispense: 14 tablet; Refill: 0    If not improving will consider Ortho referral.    Apply a compressive ACE bandage. Rest and elevate the affected painful area. Apply cold compresses intermittently as needed. As pain recedes, begin normal activities slowly as tolerated.   Call if symptoms persist.

## 2022-12-16 DIAGNOSIS — M25.512 ACUTE PAIN OF LEFT SHOULDER: Primary | ICD-10-CM

## 2022-12-18 SDOH — HEALTH STABILITY: PHYSICAL HEALTH: ON AVERAGE, HOW MANY DAYS PER WEEK DO YOU ENGAGE IN MODERATE TO STRENUOUS EXERCISE (LIKE A BRISK WALK)?: 3 DAYS

## 2022-12-18 SDOH — HEALTH STABILITY: PHYSICAL HEALTH: ON AVERAGE, HOW MANY MINUTES DO YOU ENGAGE IN EXERCISE AT THIS LEVEL?: 50 MIN

## 2022-12-21 ENCOUNTER — OFFICE VISIT (OUTPATIENT)
Dept: ORTHOPEDIC SURGERY | Age: 62
End: 2022-12-21

## 2022-12-21 VITALS — HEIGHT: 67 IN | BODY MASS INDEX: 26.68 KG/M2 | WEIGHT: 170 LBS

## 2022-12-21 DIAGNOSIS — M25.512 LEFT SHOULDER PAIN, UNSPECIFIED CHRONICITY: Primary | ICD-10-CM

## 2022-12-21 DIAGNOSIS — S46.812A STRAIN OF LEFT DELTOID MUSCLE, INITIAL ENCOUNTER: ICD-10-CM

## 2022-12-21 NOTE — PROGRESS NOTES
Date:  2022    Name:  Ellyn Rogers  Address:  [place] [address] [place] [address]    :  1960      Age:   63 y.o.    SSN:  [id number]      Medical Record Number:  [id number]    Reason for Visit:    Chief Complaint    Shoulder Pain (New patient left shoulder )      DOS:2022     HPI: Angelica Bella is a 63 y.o. female here today for atraumatic onset of left shoulder pain. Pain for the past 5 6 weeks. More pain with taking her top off especially a tight sports bra. She is right-hand dominant. Pain is located at the deltoid insertion point. Denies pain on top of her shoulder or posterior to her shoulder. Denies weakness. Has tried naproxen. Pain Assessment  Location of Pain: Shoulder  Location Modifiers: Left  Severity of Pain: 3  Quality of Pain: Sharp, Aching  Duration of Pain: A few minutes  Frequency of Pain: Intermittent  Aggravating Factors:  (overhead motion)  Limiting Behavior: Some  Relieving Factors: Rest  Result of Injury: No  Work-Related Injury: No  Are there other pain locations you wish to document?: No  ROS: All systems reviewed on patient intake form. Pertinent items are noted in HPI. Past Medical History:   Diagnosis Date    Bilateral closed proximal tibial stress fracture 2015    MRI dated 2015 shows a nondisplaced fracture of the medial proximal tibial metaphysis of both knees     Chondromalacia patellae of left knee 2015    Foot pain 10/13/2010    Pain in posterior tibial tendon- Dr. Corinne Boyden podiatry     Hypothyroidism     Menopausal state age 48    MVP (mitral valve prolapse)     last echo 2006 showed MVP otherwise nl-no regurg or stenosis of MV    Nondisplaced fracture of fifth metatarsal bone, left foot, initial encounter for closed fracture 2019 in [facility]  In [address] [place] [address] for 10 weeks     Stress fracture of tibia 2017    Bilateral 2016-followed by Dr. Bart Thomas Bilateral stress fractures!     Tear of medial meniscus of right knee 7/21/2015    Tendonitis, tibialis 10/13/2010        Past Surgical History:   Procedure Laterality Date    BREAST ENHANCEMENT SURGERY Bilateral     91, 00, 04    COLONOSCOPY  2013    adenomatous polyp 2-13 for 5 yr marva    COLONOSCOPY  2/26/18  repeat x 5  yrs.     DILATION AND CURETTAGE OF UTERUS         Family History   Problem Relation Age of Onset    Thyroid Disease Mother     High Cholesterol Mother     Hypertension Mother     Other Father         Atherosclerotic Disease; Spastic paraparesis of legs     No Known Problems Brother     No Known Problems Son     No Known Problems Daughter        Social History     Socioeconomic History    Marital status:      Spouse name: None    Number of children: None    Years of education: None    Highest education level: None   Tobacco Use    Smoking status: Never    Smokeless tobacco: Never   Substance and Sexual Activity    Alcohol use: Not Currently     Comment: social    Drug use: No    Sexual activity: Yes     Partners: Male     Social Determinants of Health     Financial Resource Strain: Low Risk     Difficulty of Paying Living Expenses: Not hard at all   Food Insecurity: No Food Insecurity    Worried About Running Out of Food in the Last Year: Never true    Ran Out of Food in the Last Year: Never true   Physical Activity: Sufficiently Active    Days of Exercise per Week: 3 days    Minutes of Exercise per Session: 50 min   Intimate Partner Violence: Not At Risk    Fear of Current or Ex-Partner: No    Emotionally Abused: No    Physically Abused: No    Sexually Abused: No       Current Outpatient Medications   Medication Sig Dispense Refill    nadolol (CORGARD) 20 MG tablet TAKE 1 TABLET BY MOUTH IN THE MORNING 90 tablet 0    naproxen (NAPROSYN) 250 MG tablet Take 1 tablet by mouth 2 times daily as needed for Pain 14 tablet 0    levothyroxine (SYNTHROID) 75 MCG tablet TAKE 1 TABLET BY MOUTH 6 DAYS A WEEK, THEN 1 1/2 TABLETS BY MOUTH 1 DAY A WEEK 35 tablet 5    liothyronine (CYTOMEL) 5 MCG tablet TAKE 1 TABLET BY MOUTH DAILY 30 tablet 5    omeprazole 20 MG EC tablet TAKE 1 TABLET BY MOUTH TWICE DAILY FOR 2 WEEKS      ANTACID 200-200-20 MG/5ML SUSP suspension SHAKE LIQUID AND TAKE 10 ML BY MOUTH FOUR TIMES DAILY AS NEEDED FOR INDIGESTION (Patient not taking: No sig reported)      norethindrone-ethinyl estradiol (JINTELI) 1-5 MG-MCG TABS per tablet Take 1 tablet by mouth daily       No current facility-administered medications for this visit. No Known Allergies    Vital signs:  Ht 5' 7\" (1.702 m)   Wt 170 lb (77.1 kg)   BMI 26.63 kg/m²          L shoulder exam    Inspection:  Held in a normal posture. Normal contour at the acromioclavicular joint. No swelling, ecchymosis, or erythema about the shoulder. No atrophy appreciated. No scapular winging. Palpation:  No subacromial crepitus. No tenderness of the AC joint. No greater tuberosity tenderness. No tenderness in the bicipital groove. Range of Motion: Full passive and active ROM. Normal scapulothoracic rhythm. Strength:  Normal supraspinatus, infraspinatus, and subscapularis muscle strength. Stability: No anterior instability. No posterior instability. Special Tests: Impingement findings are negative. Labral findings are negative. Speed sign and Yergason signs are both negative. Crossover sign is negative. Belly press sign is negative. Lift off sign is negative. Other findings: The skin is warm dry and well perfused. 2+ radial pulse. Sensation is intact to light touch over the deltoid. R comparison shoulder exam    Inspection:  Held in a normal posture. Normal contour at the acromioclavicular joint. No swelling, ecchymosis, or erythema about the shoulder. No atrophy appreciated. No scapular winging. Palpation:  No subacromial crepitus. No tenderness of the AC joint. No greater tuberosity tenderness. No tenderness in the bicipital groove.     Range of Motion: Full passive and active ROM. Normal scapulothoracic rhythm. Strength:  Normal supraspinatus, infraspinatus, and subscapularis muscle strength. Stability: No anterior instability. No posterior instability. Special Tests: Impingement findings are negative. Labral findings are negative. Speed sign and Yergason signs are both negative. Crossover sign is negative. Belly press sign is negative. Lift off sign is negative. Other findings: The skin is warm dry and well perfused. 2+ radial pulse. Sensation is intact to light touch over the deltoid. Diagnostics:  Radiology:       Findings:   3 v left shoulder    Impression:  Maintained GH bony alignment, no fx dx, no tumor or infection      Assessment: left deltoid strain, insertional tendinitis    Plan: Pertinent imaging was reviewed. The etiology, natural history, and treatment options for the disorder were discussed. The roles of activity medication, antiinflammatories, injections, bracing, physical therapy, and surgical interventions were all described to the patient and questions were answered. PT, iontoparesis, use all modalities. Follow up in 4-6 weeks making sure trending positive. Alon Sanon is in agreement with this plan. All questions were answered to patient's satisfaction and was encouraged to call with any further questions. The patient was advised that NSAID-type medications have several potential side effects that include: gastrointestinal irritation including hemorrhage, renal injury, as well as an increased risk for heart attack and stroke. The patient was asked to take the medication with food and to stop if there is any symptoms of GI upset, including heartburn, nausea, increased gas or diarrhea. I asked the patient to contact their medical provider for vomiting, abdominal pain or black/bloody stools. The patient should have renal function testing per his medical provider periodically if the medication is taken on a regular basis.   The patient should be alert for any swelling in the lower extremities and should stop taking the medication immediately and contact their medical provider should this occur. In addition, the patient should stop taking the medication immediately and contact their medical provider should there be any shortness of breath, fatigue and be evaluated in an emergency facility for any chest pain. The patient expresses understanding of these issues and questions were answered. Total time spent for evaluation, education, and development of treatment plan: 42 minutes. Rosana Ny MD  [facility] Clinical Fellow  12/21/2022    Orders Placed This Encounter   Procedures    XR SHOULDER LEFT (MIN 2 VIEWS)     Standing Status:   Future     Number of Occurrences:   1     Standing Expiration Date:   12/20/2023     Order Specific Question:   Reason for exam:     Answer:   shoulder pain        I attest that I met personally with the patient, performed the described exam, reviewed the radiographic studies and medical records associated with this patient and supervised the services that are described above.      Efraín Rolon MD

## 2022-12-29 RX ORDER — NADOLOL 20 MG/1
20 TABLET ORAL DAILY
Qty: 90 TABLET | Refills: 0 | Status: SHIPPED | OUTPATIENT
Start: 2022-12-29 | End: 2023-01-28

## 2023-01-03 ENCOUNTER — HOSPITAL ENCOUNTER (OUTPATIENT)
Dept: PHYSICAL THERAPY | Age: 63
Setting detail: THERAPIES SERIES
Discharge: HOME | End: 2023-01-03
Payer: COMMERCIAL

## 2023-01-03 PROCEDURE — 97161 PT EVAL LOW COMPLEX 20 MIN: CPT

## 2023-01-03 NOTE — PLAN OF CARE
[facility] and [address]  Phone [phone]  Fax [phone]      Physical Therapy Certification    Dear Dr Rosie Cardona ,    We had the pleasure of evaluating the following patient for physical therapy services at [address] [place] [address]. A summary of our findings can be found in the initial assessment below. This includes our plan of care. If you have any questions or concerns regarding these findings, please do not hesitate to contact me at the office phone number checked above.   Thank you for the referral.       Physician Signature:_______________________________Date:__________________  By signing above (or electronic signature), therapists plan is approved by physician      Patient: Dann Wheeler   : 1960   MRN: [id number]  Referring Physician:        Evaluation Date: 1/3/2023      Medical Diagnosis Information:  Diagnosis: P24.460 (ICD-10-CM) - Left shoulder pain, unspecified chronicity  S46.812A (ICD-10-CM) - Strain of left deltoid muscle, initial encounter   Treatment Diagnosis: M25.512 (ICD-10-CM) - Left shoulder pain, unspecified chronicity                                         Insurance information: PT Insurance Information: [employer]    Precautions/ Contra-indications:     C-SSRS Triggered by Intake questionnaire (Past 2 wk assessment):   [] No, Questionnaire did not trigger screening.   [] Yes, Patient intake triggered further evaluation      [] C-SSRS Screening completed  [] PCP notified via Plan of Care  [] Emergency services notified     Latex Allergy:  [x]NO      []YES  Preferred Language for Healthcare:   [x]English       []other:    SUBJECTIVE: Patient states that a few months ago reports that when she was taking off her sports brah that maybe themotion trigged a strain to her left shoulder that has not gotten worse but hasn't gotten too much better either at first. Patient states that over the last few weeks things have been getting better. Patient states that she still feels it in certain motion and remindes her its still there. Patient states that she would like to get back to the gym pain free    Relevant Medical History: Osteopenia  Functional Disability Index: FOTO 72    Pain Scale: 0-2/10  Easing factors: Not using it  Provocative factors: Taking shirts off/on over head, reaching for car door, turning steer wheel a certain way    Type: []Constant   []Intermittent  []Radiating []Localized [x]other: sharp      Numbness/Tingling: denies    Occupation/School: Desk work    Living Status/Prior Level of Function: Independent with ADLs and IADL's     OBJECTIVE:     ROM PROM AROM  Comment    L R L R    Flexion Full Full 115 Full pain   Abduction   90  pain   ER   C7 T2    IR   T12 T10    Other  (cervical)        Other             Strength L R Comment   Flexion 3-/5 5/5    Abduction 2+/5 5/5    ER 4/5 5/5    IR 5/5 5/5    Supraspinatus      Upper Trap      Lower Trap      Mid Trap      Rhomboids      Biceps      Triceps      Horizontal Abduction      Horizontal Adduction      Lats        Special Tests Results/Comment   Henson-Ramses +   Neers    Speeds    ER/IR RIT -   Abduction RIT +   Load & Shift         Reflexes/Sensation:               [x]Dermatomes/Myotomes intact               []Reflexes equal and normal bilaterally               []Other:     Joint mobility:               [x]Normal               []Hypo              []Hyper     Palpation: -     Functional Mobility/Transfers: n/a     Posture: FHRS     Bandages/Dressings/Incisions: n/a     Gait: (include devices/WB status): n/a     Orthopedic Special Tests: see above                       [x] Patient history, allergies, meds reviewed. Medical chart reviewed. See intake form. Review Of Systems (ROS):  [x]Performed Review of systems (Integumentary, CardioPulmonary, Neurological) by intake and observation.  Intake form has been scanned into medical record. Patient has been instructed to contact their primary care physician regarding ROS issues if not already being addressed at this time. Co-morbidities/Complexities (which will affect course of rehabilitation):   []None              Arthritic conditions   []Rheumatoid arthritis (M05.9)  [x]Osteoarthritis (M19.91)    Cardiovascular conditions   []Hypertension (I10)  []Hyperlipidemia (E78.5)  []Angina pectoris (I20)  []Atherosclerosis (I70)    Musculoskeletal conditions   []Disc pathology   []Congenital spine pathologies   []Prior surgical intervention  []Osteoporosis (M81.8)  [x]Osteopenia (M85.8)   Endocrine conditions   []Hypothyroid (E03.9)  []Hyperthyroid Gastrointestinal conditions   []Constipation (T37.25)    Metabolic conditions   []Morbid obesity (E66.01)  []Diabetes type 1(E10.65) or 2 (E11.65)   []Neuropathy (G60.9)      Pulmonary conditions   []Asthma (J45)  []Coughing   []COPD (J44.9)    Psychological Disorders  []Anxiety (F41.9)  []Depression (F32.9)   []Other:    []Other:            Barriers to/and or personal factors that will affect rehab potential:              []Age  []Sex              []Motivation/Lack of Motivation                        []Co-Morbidities              []Cognitive Function, education/learning barriers              []Environmental, home barriers              []profession/work barriers  [x]past PT/medical experience  []other:  Justification:      Falls Risk Assessment (30 days):   [x] Falls Risk assessed and no intervention required.   [] Falls Risk assessed and Patient requires intervention due to being higher risk   TUG score (>12s at risk):     [] Falls education provided, including         ASSESSMENT:   Functional Impairments              []Noted spinal or UE joint hypomobility              []Noted spinal or UE joint hypermobility              [x]Decreased UE functional ROM              [x]Decreased UE functional strength              []Abnormal reflexes/sensation/myotomal/dermatomal deficits              [x]Decreased RC/scapular/core strength and neuromuscular control              []other:       Functional Activity Limitations (from functional questionnaire and intake)              [x]Reduced ability to tolerate prolonged functional positions              [x]Reduced ability or difficulty with changes of positions or transfers between positions              [x]Reduced ability to maintain good posture and demonstrate good body mechanics with sitting, bending, and lifting              [] Reduced ability or tolerance with driving and/or computer work              []Reduced ability to sleep              [x]Reduced ability to perform lifting, reaching, carrying tasks              [x]Reduced ability to tolerate impact through UE              [x]Reduced ability to reach behind back              [x]Reduced ability to  or hold objects              [x]Reduced ability to throw or toss an object              []other:       Participation Restrictions              [x]Reduced participation in self care activities              [x]Reduced participation in home management activities              [x]Reduced participation in work activities              [x]Reduced participation in social activities. [x]Reduced participation in sport / recreational activities. Classification:              []Signs/symptoms consistent with post-surgical status including decreased ROM, strength and function.   []Signs/symptoms consistent with joint sprain/strain              []Signs/symptoms consistent with shoulder impingement              [x]Signs/symptoms consistent with shoulder/elbow/wrist tendinopathy              []Signs/symptoms consistent with Rotator cuff tear              []Signs/symptoms consistent with labral tear              []Signs/symptoms consistent with postural dysfunction                         []Signs/symptoms consistent with Glenohumeral IR Deficit - <45 degrees              []Signs/symptoms consistent with facet dysfunction of cervical/thoracic spine                         []Signs/symptoms consistent with pathology which may benefit from Dry needling                []other:      Prognosis/Rehab Potential:                                       []Excellent              [x]Good                 []Fair              []Poor     Tolerance of evaluation/treatment:               []Excellent              [x]Good                 []Fair              []Poor     Physical Therapy Evaluation Complexity Justification  [x] A history of present problem with:  [] no personal factors and/or comorbidities that impact the plan of care;  [x]1-2 personal factors and/or comorbidities that impact the plan of care  []3 personal factors and/or comorbidities that impact the plan of care  [x] An examination of body systems using standardized tests and measures addressing any of the following: body structures and functions (impairments), activity limitations, and/or participation restrictions;:  [x] a total of 1-2 or more elements   [] a total of 3 or more elements   [] a total of 4 or more elements   [x] A clinical presentation with:  [x] stable and/or uncomplicated characteristics   [] evolving clinical presentation with changing characteristics  [] unstable and unpredictable characteristics;   [x] Clinical decision making of [] low, [] moderate, [] high complexity using standardized patient assessment instrument and/or measurable assessment of functional outcome.      [x] EVAL (LOW) 41565 (typically 20 minutes face-to-face)  [] EVAL (MOD) 69871 (typically 30 minutes face-to-face)  [] EVAL (HIGH) 10883 (typically 45 minutes face-to-face)  [] RE-EVAL         PLAN:  Frequency/Duration:  1 days per week for 4 Weeks:  INTERVENTIONS:  [x] Therapeutic exercise including: strength training, ROM, for Upper extremity and core   [x]  NMR activation and proprioception for UE, scap and Core   [x] Manual therapy as indicated for shoulder, scapula and spine to include: Dry Needling/IASTM, STM, PROM, Gr I-IV mobilizations, manipulation. [x] Modalities as needed that may include: thermal agents, E-stim, Biofeedback, US, iontophoresis as indicated  [x] Patient education on joint protection, postural re-education, activity modification, progression of HEP. HEP instruction:     Access Code: [email or web address]: [email or web address]  Date: 01/03/2023  Prepared by: Mehreen Monteiro    Exercises  Shoulder Flexion Wall Slide with Towel - 1 x daily - 7 x weekly - 3 sets - 10 reps  Standing Shoulder Abduction Reactive Isometrics with Elbow Extended - 1 x daily - 7 x weekly - 3 sets - 10 reps  Shoulder Internal Rotation with Resistance - 1 x daily - 7 x weekly - 3 sets - 10 reps  Shoulder External Rotation with Anchored Resistance - 1 x daily - 7 x weekly - 3 sets - 10 reps  Standing Row with Anchored Resistance - 1 x daily - 7 x weekly - 3 sets - 10 reps       GOALS:   Patient stated goal: To return to the gym pain free   [] Progressing: [] Met: [] Not Met: [] Adjusted    Therapist goals for Patient:   Short Term Goals: To be achieved in: 2 weeks  1. Independent in HEP and progression per patient tolerance, in order to prevent re-injury. [] Progressing: [] Met: [] Not Met: [] Adjusted   2. Patient will have a decrease in pain by one grade to facilitate improvement in movement, function, and ADLs as indicated by Functional Deficits. [] Progressing: [] Met: [] Not Met: [] Adjusted    Long Term Goals: To be achieved in: 4 weeks  1. FOTO score of 74 or greater to assist with reaching prior level of function. [] Progressing: [] Met: [] Not Met: [] Adjusted  2. Patient will demonstrate increased AROM to WNL to allow for proper joint functioning as indicated by patients Functional Deficits. [] Progressing: [] Met: [] Not Met: [] Adjusted  3.  Patient will demonstrate an increase in LUE strength to 5/5 to allow for proper functional mobility as indicated by patients Functional Deficits. [] Progressing: [] Met: [] Not Met: [] Adjusted  4. Patient will return to all functional activities without increased symptoms or restriction. [] Progressing: [] Met: [] Not Met: [] Adjusted  5. Patient will return to the gym pain free  [] Progressing: [] Met: [] Not Met: [] Adjusted     Electronically signed by:  Mehreen Monteiro PT, DPT, CSCS    Note: If patient does not return for scheduled/ recommended follow up visits, this note will serve as a discharge from care along with most recent update on progress.

## 2023-01-03 NOTE — FLOWSHEET NOTE
The [address] [facility] and [facility], [facility] [address], [address] [place] [address]  Phone: [phone]   Fax:     [phone]    Physical Therapy Daily Treatment Note  Date:  1/3/2023    Patient Name:  Jessy Riggins    :  1960  MRN: [id number]  Restrictions/Precautions:    Medical/Treatment Diagnosis Information:  Diagnosis: M25.512 (ICD-10-CM) - Left shoulder pain, unspecified chronicity  S46.812A (ICD-10-CM) - Strain of left deltoid muscle, initial encounter  Treatment Diagnosis: M25.512 (ICD-10-CM) - Left shoulder pain, unspecified chronicity  Insurance/Certification information:  PT Insurance Information: [address]  Physician Information:     Has the plan of care been signed (Y/N):        []  Yes  [x]  No     Date of Patient follow up with Physician:       Is this a Progress Report:     []  Yes  [x]  No        If Yes:  Date Range for reporting period:  Beginning /3  Ending    Progress report will be due (10 Rx or 30 days whichever is less): 2/3      Recertification will be due (POC Duration  / 90 days whichever is less): /3        Visit # Insurance Allowable Auth Required   [place] []  Yes [x]  No        Functional Scale: FOTO 72    Date assessed: 1/3    Latex Allergy:  [x]NO      []YES  Preferred Language for Healthcare:   [x]English       []other:    Pain level:  0-2/10     SUBJECTIVE:  See eval    OBJECTIVE: See eval  Observation:   Test measurements:      RESTRICTIONS/PRECAUTIONS:     Exercises/Interventions:   Exercises:  Exercise/Equipment Resistance/Repetitions Other comments   Stretching/PROM     Wand     Wall Slides     UE Cromona     Pulleys     Pendulum          Isometrics     Retraction          Weight shift     Flexion     Abduction     External Rotation     Internal Rotation     Biceps     Triceps          PRE's     Flexion     Abduction     External Rotation     Internal Rotation     Shrugs     EXT     Reverse Flys     Serratus Horizontal Abd with ER     Biceps     Triceps     Retraction          Cable Column/Theraband     External Rotation     Internal Rotation     Shrugs     Lats     Ext     Flex     Scapular Retraction     BIC     TRIC     PNF          Dynamic Stability          Plyoback          Manual interventions                     Therapeutic Exercise and NMR EXR  [] (02916) Provided verbal/tactile cueing for activities related to strengthening, flexibility, endurance, ROM  for improvements in scapular, scapulothoracic and UE control with self care, reaching, carrying, lifting, house/yardwork, driving/computer work.    [] (71086) Provided verbal/tactile cueing for activities related to improving balance, coordination, kinesthetic sense, posture, motor skill, proprioception  to assist with  scapular, scapulothoracic and UE control with self care, reaching, carrying, lifting, house/yardwork, driving/computer work. Therapeutic Activities:    [] (55256 or 67816) Provided verbal/tactile cueing for activities related to improving balance, coordination, kinesthetic sense, posture, motor skill, proprioception and motor activation to allow for proper function of scapular, scapulothoracic and UE control with self care, carrying, lifting, driving/computer work.      Home Exercise Program:    [x] (02710) Reviewed/Progressed HEP activities related to strengthening, flexibility, endurance, ROM of scapular, scapulothoracic and UE control with self care, reaching, carrying, lifting, house/yardwork, driving/computer work  [] (18254) Reviewed/Progressed HEP activities related to improving balance, coordination, kinesthetic sense, posture, motor skill, proprioception of scapular, scapulothoracic and UE control with self care, reaching, carrying, lifting, house/yardwork, driving/computer work      Manual Treatments:  PROM / STM / Oscillations-Mobs:  G-I, II, III, IV (PA's, Inf., Post.)  [] (76755) Provided manual therapy to mobilize soft tissue/joints of cervical/CT, scapular GHJ and UE for the purpose of modulating pain, promoting relaxation,  increasing ROM, reducing/eliminating soft tissue swelling/inflammation/restriction, improving soft tissue extensibility and allowing for proper ROM for normal function with self care, reaching, carrying, lifting, house/yardwork, driving/computer work    Modalities:      Charges:  Timed Code Treatment Minutes: 20   Total Treatment Minutes: 30       [x] EVAL (LOW) 85310 (typically 20 minutes face-to-face)  [] EVAL (MOD) 47221 (typically 30 minutes face-to-face)  [] EVAL (HIGH) 28025 (typically 45 minutes face-to-face)  [] RE-EVAL     [] JY(64714) x     [] IONTO  [] NMR (10322) x     [] VASO  [] Manual (19981) x      [] Other:  [] TA x      [] Mech Traction (57552)  [] ES(attended) (93134)      [] ES (un) (46848):     GOALS:    Patient stated goal: To return to the gym pain free   [] Progressing: [] Met: [] Not Met: [] Adjusted    Therapist goals for Patient:   Short Term Goals: To be achieved in: 2 weeks  1. Independent in HEP and progression per patient tolerance, in order to prevent re-injury. [] Progressing: [] Met: [] Not Met: [] Adjusted   2. Patient will have a decrease in pain by one grade to facilitate improvement in movement, function, and ADLs as indicated by Functional Deficits. [] Progressing: [] Met: [] Not Met: [] Adjusted    Long Term Goals: To be achieved in: 4 weeks  1. FOTO score of 74 or greater to assist with reaching prior level of function. [] Progressing: [] Met: [] Not Met: [] Adjusted  2. Patient will demonstrate increased AROM to WNL to allow for proper joint functioning as indicated by patients Functional Deficits. [] Progressing: [] Met: [] Not Met: [] Adjusted  3. Patient will demonstrate an increase in LUE strength to 5/5 to allow for proper functional mobility as indicated by patients Functional Deficits. [] Progressing: [] Met: [] Not Met: [] Adjusted  4.  Patient will return to all functional activities without increased symptoms or restriction. [] Progressing: [] Met: [] Not Met: [] Adjusted  5. Patient will return to the gym pain free  [] Progressing: [] Met: [] Not Met: [] Adjusted     Overall Progression Towards Functional goals/ Treatment Progress Update:  [] Patient is progressing as expected towards functional goals listed. [] Progression is slowed due to complexities/Impairments listed. [] Progression has been slowed due to co-morbidities. [x] Plan just implemented, too soon to assess goals progression <30days   [] Goals require adjustment due to lack of progress  [] Patient is not progressing as expected and requires additional follow up with physician  [] Other    Prognosis for POC: [x] Good [] Fair  [] Poor      Patient requires continued skilled intervention: [x] Yes  [] No    Treatment/Activity Tolerance:  [x] Patient able to complete treatment  [] Patient limited by fatigue  [] Patient limited by pain     [] Patient limited by other medical complications  [] Other:                   Patient Education:       Access Code: [address]           PLAN: See eval  [] Continue per plan of care [] Alter current plan (see comments above)  [x] Plan of care initiated [] Hold pending MD visit [] Discharge      Electronically signed by:  Mehreen Monteiro PT, DPT, CSCS    Note: If patient does not return for scheduled/ recommended follow up visits, this note will serve as a discharge from care along with most recent update on progress.

## 2023-01-10 DIAGNOSIS — E55.9 VITAMIN D DEFICIENCY: ICD-10-CM

## 2023-01-10 DIAGNOSIS — M85.80 OSTEOPENIA, UNSPECIFIED LOCATION: ICD-10-CM

## 2023-01-10 DIAGNOSIS — E03.9 ACQUIRED HYPOTHYROIDISM: ICD-10-CM

## 2023-01-10 DIAGNOSIS — R73.01 IFG (IMPAIRED FASTING GLUCOSE): ICD-10-CM

## 2023-01-10 LAB
A/G RATIO: 1.7 (ref 1.1–2.2)
ALBUMIN SERPL-MCNC: 4.1 G/DL (ref 3.4–5)
ALP BLD-CCNC: 60 U/L (ref 40–129)
ALT SERPL-CCNC: 12 U/L (ref 10–40)
ANION GAP SERPL CALCULATED.3IONS-SCNC: 12 MMOL/L (ref 3–16)
AST SERPL-CCNC: 16 U/L (ref 15–37)
BILIRUB SERPL-MCNC: 0.5 MG/DL (ref 0–1)
BUN BLDV-MCNC: 12 MG/DL (ref 7–20)
CALCIUM SERPL-MCNC: 9.4 MG/DL (ref 8.3–10.6)
CHLORIDE BLD-SCNC: 103 MMOL/L (ref 99–110)
CO2: 24 MMOL/L (ref 21–32)
CREAT SERPL-MCNC: 0.9 MG/DL (ref 0.6–1.2)
ESTIMATED AVERAGE GLUCOSE: 88.2 MG/DL
GFR SERPL CREATININE-BSD FRML MDRD: >60 ML/MIN/{1.73_M2}
GLUCOSE BLD-MCNC: 92 MG/DL (ref 70–99)
HBA1C MFR BLD: 4.7 %
POTASSIUM SERPL-SCNC: 4.4 MMOL/L (ref 3.5–5.1)
SODIUM BLD-SCNC: 139 MMOL/L (ref 136–145)
T3 FREE: 4.3 PG/ML (ref 2.3–4.2)
T4 FREE: 1.7 NG/DL (ref 0.9–1.8)
TOTAL PROTEIN: 6.5 G/DL (ref 6.4–8.2)
TSH SERPL DL<=0.05 MIU/L-ACNC: 1.39 UIU/ML (ref 0.27–4.2)
VITAMIN D 25-HYDROXY: 41.7 NG/ML

## 2023-01-11 ENCOUNTER — HOSPITAL ENCOUNTER (OUTPATIENT)
Dept: PHYSICAL THERAPY | Age: 63
Setting detail: THERAPIES SERIES
Discharge: HOME | End: 2023-01-11
Payer: COMMERCIAL

## 2023-01-11 NOTE — FLOWSHEET NOTE
Physical Therapy  Cancellation/No-show Note  Patient Name:  Beena Osullivan  :  1960   Date:  2023  Cancelled visits to date: 0  No-shows to date: 0    For today's appointment patient:  []  Cancelled  []  Rescheduled appointment  [x]  No-show     Reason given by patient:  []  Patient ill  []  Conflicting appointment  []  No transportation    []  Conflict with work  []  No reason given  []  Other:     Comments:      Electronically signed by:  Sridhar Monteiro PT, DPT

## 2023-01-16 ENCOUNTER — OFFICE VISIT (OUTPATIENT)
Dept: ENDOCRINOLOGY | Age: 63
End: 2023-01-16
Payer: COMMERCIAL

## 2023-01-16 VITALS
OXYGEN SATURATION: 99 % | SYSTOLIC BLOOD PRESSURE: 122 MMHG | HEIGHT: 67 IN | TEMPERATURE: 98 F | DIASTOLIC BLOOD PRESSURE: 64 MMHG | HEART RATE: 74 BPM | RESPIRATION RATE: 14 BRPM | BODY MASS INDEX: 27.62 KG/M2 | WEIGHT: 176 LBS

## 2023-01-16 DIAGNOSIS — E03.9 ACQUIRED HYPOTHYROIDISM: Primary | ICD-10-CM

## 2023-01-16 DIAGNOSIS — E55.9 VITAMIN D DEFICIENCY: ICD-10-CM

## 2023-01-16 DIAGNOSIS — M85.80 OSTEOPENIA, UNSPECIFIED LOCATION: ICD-10-CM

## 2023-01-16 DIAGNOSIS — R73.01 IFG (IMPAIRED FASTING GLUCOSE): ICD-10-CM

## 2023-01-16 DIAGNOSIS — Z78.0 MENOPAUSE: ICD-10-CM

## 2023-01-16 PROCEDURE — 3078F DIAST BP <80 MM HG: CPT | Performed by: INTERNAL MEDICINE

## 2023-01-16 PROCEDURE — 99214 OFFICE O/P EST MOD 30 MIN: CPT | Performed by: INTERNAL MEDICINE

## 2023-01-16 PROCEDURE — 3074F SYST BP LT 130 MM HG: CPT | Performed by: INTERNAL MEDICINE

## 2023-01-16 RX ORDER — M-VIT,TX,IRON,MINS/CALC/FOLIC 27MG-0.4MG
1 TABLET ORAL DAILY
COMMUNITY

## 2023-01-16 RX ORDER — ZINC GLUCONATE 50 MG
50 TABLET ORAL DAILY
COMMUNITY

## 2023-01-16 RX ORDER — LIOTHYRONINE SODIUM 5 UG/1
TABLET ORAL
Qty: 30 TABLET | Refills: 11 | Status: SHIPPED | OUTPATIENT
Start: 2023-01-16

## 2023-01-16 RX ORDER — CALCIUM CARBONATE 500(1250)
500 TABLET ORAL DAILY
COMMUNITY

## 2023-01-16 RX ORDER — ASCORBIC ACID 500 MG
500 TABLET ORAL DAILY
COMMUNITY

## 2023-01-16 RX ORDER — LEVOTHYROXINE SODIUM 0.07 MG/1
TABLET ORAL
Qty: 35 TABLET | Refills: 11 | Status: SHIPPED | OUTPATIENT
Start: 2023-01-16

## 2023-01-16 NOTE — PROGRESS NOTES
SUBJECTIVE:  Nanette Gallegos is a 63 y.o. female who is here for hypothyroidism. 1. Acquired hypothyroidism    This started in 2010. Patient was diagnosed with hypothyroidism. The problem has been unchanged. Patient started medication in 2015. Currently patient is on: levothyroxine, liothyronide. Misses  0 doses a month. Current complaints:  fatigue  Has a lot of stress at work. Exercises, eating better    History of obstructive symptoms: difficulty swallowing No, changes in voice/hoarseness No.  History of radiation to patient's neck: No  Resent iodine exposure: No  Family history includes hypothyroidism, goiter  Family history of thyroid cancer: No    2. Menopause  Periods stopped at age 50. No hot flashes on estrogen. 3. Osteopenia, unspecified location  Had stress fractures in tibula. Healed recent 5th metatarsal left leg fracture. No bone pain. Mother or father no hip or spine fractures  No smoking  No RA  No long term steroids    4. Vitamin D deficiency  No bone pain. 5. IFG   Has family Hx of diabetes      EXAMINATION:   BONE DENSITOMETRY       11/29/2022 9:29 am       TECHNIQUE:   A bone density dual x-ray absorptiometry (DXA) scan was performed of the   lumbar spine and bilateral hips on a [employer] System. COMPARISON:   07/23/2018       HISTORY:   ORDERING SYSTEM PROVIDED HISTORY: Osteopenia, unspecified location       Gender: F       Age: 62 y/o       FINDINGS:   LUMBAR SPINE: L1-L4       BMD: 0.940 g/cm2       T-score: -1.0       Z-score: 0.5       There has been no statistically significant change in the bone mineral   density of the lumbar spine since the prior exam date listed above. LEFT TOTAL HIP:       BMD: 0.755 g/cm2       T-score: -1.5       Z-score: -0.6       LEFT FEMORAL NECK:   BMD: 0.651 g/cm2   T-score: -1.8       Z-score: -0.5       There has been a DECREASE of 4.2% in the bone mineral density of the total   hip since the prior exam date listed above. RIGHT TOTAL HIP:   BMD: 0.793 g/cm2   T-score: -1.2   Z-score: -0.3       RIGHT FEMORAL NECK:   BMD: 0.699 g/cm2   T-score: -1.4   Z-score: -0.1       There has been no statistically significant change in the bone mineral   density of the total hip since the prior exam date listed above. FRAX:       10-year fracture risk is performed using the [facility]   calculator based on patient-reported risk factors. Major osteoporotic fracture: 15%       Hip fracture: 1.6%       Other situations known to alter the reliability of the FRAX score should be   considered when making treatment decisions, including chronic glucocorticoid   use and past treatments. Further guidance on treatment can be found at the   [facility]oporosis Foundation's website [phone]. Impression   Osteopenia by WHO criteria. BONE DENSITOMETRY (Dexa)        HISTORY: Postmenopausal estrogen deficiency            Impression   IMPRESSION:        Normal mineralization of the lumbar spine with T. evaluate -0.9. There is mild osteopenia of the L4 segment with T. evaluate -1.3       Osteopenia of the total hip and femoral neck with T. diagnosis of   -1.2 and -1.6, respectively       Comparison with 2010 shows 5.4% increase in bone mineral density   of the spine and 2.5% increase in bone mineral density of the hip       Frax index indicates a 5.9% risk of major osteoporotic fracture   over the next 10 years and 0.5% risk of hip fracture over the same   time frame       The T-value compares the patient's bone mineral density with the   peak bone mass of young normal patient's (average BMD of young   age, sex and race matched controls). Accor ding to the WHO (World   Health Organization) criteria, patients with T-values between -1   and -2.5 have a low bone mass or density (osteopenia). Patients   with T-values less than -2.5 have osteoporosis.   The Z-value   compares the patient's bone mineral density with age and   sex-matched peers (average BMD of same age, sex and race matched   controls). A T score below -2.5, especially in the presence of risk factors   in post menopausal women, indicates the need for treatment to   prevent fractures. A T score below -1 within five years after   menopause or a Z score below -1 at any age indicates the need to   prevent further bone loss. A Z score below -2 indicates   accelerated bone loss and suggests further studies to identify   possible major risk factors (2). The risk of vertebral or hip fracture approximately doubles   (1.5-2.5) for each decrease of 1 standard deviation in T-score   (2). Low bone density is not the only risk factor for fracture. Other risk factors are patient age, risk of falling, previous   osteoporotic fracture and family history of osteoporosis. Serial examinations of bone mineral density are most cost   effective when performed every 12-24 months because of the   relatively slow rate of bone resorption and remodeling. The   change in a patient's bone mineral density between the two exams   should be at least 5% to be meaningful (4.2-5.5% for the lumbar   spine, 5.5-8.5% for the hip)  (3). References:        (1) International Society for Clinical Densitometry 2007. (2) [place]; [phone]. (3) [place], [address] [place] [address]; 62: 207-214. EXAMINATION:   THYROID ULTRASOUND       7/23/2018       COMPARISON:   None. HISTORY:   ORDERING PHYSICIAN PROVIDED HISTORY: Goiter   TECHNOLOGIST PROVIDED HISTORY:   Technologist Provided Reason for Exam: nodule   Acuity: Unknown   Type of Encounter: Unknown       FINDINGS:   Right thyroid lobe:  3.6 x 1.1 x 1.0 cm       Left thyroid lobe:  3.6 x 1.3 x 1.2 cm       Isthmus:  2 mm       Thyroid Gland:  Thyroid gland demonstrates normal echotexture and vascularity. Nodules: No thyroid nodules are present. Cervical lymphadenopathy: No abnormal lymph nodes in the imaged portions of   the neck. Impression   Unremarkable thyroid ultrasound. Past Medical History:   Diagnosis Date    Bilateral closed proximal tibial stress fracture 8/6/2015    MRI dated 7/28/2015 shows a nondisplaced fracture of the medial proximal tibial metaphysis of both knees     Chondromalacia patellae of left knee 7/21/2015    Foot pain 10/13/2010    Pain in posterior tibial tendon- Dr. Mignon Vega podiatry     Hypothyroidism     Menopausal state age 48    MVP (mitral valve prolapse)     last echo 9/2006 showed MVP otherwise nl-no regurg or stenosis of MV    Nondisplaced fracture of fifth metatarsal bone, left foot, initial encounter for closed fracture 1/11/2019 5/2018 in [facility]  In [address] [place] [address] for 10 weeks     Stress fracture of tibia 9/28/2017    Bilateral 2016-followed by Dr. Stacia Alvarado Bilateral stress fractures!     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    Fuchs' corneal dystrophy 06/06/2016    Contact dermatitis and other eczema, due to unspecified cause 02/18/2010    Palpitations 12/12/2008    IFG (impaired fasting glucose) 02/10/2020    Low back pain 10/08/2019    Vitamin D deficiency 08/08/2019    Adenomatous polyp 10/10/2017    Constipation 12/18/2015    Hypothyroidism     Osteopenia     Bilateral knee pain 07/14/2015    Ocular muscular dystrophy ([facility] [address] 03/29/2013    Headache 03/29/2013    Insomnia 11/07/2011    Anxiety 11/07/2011    PAC (premature atrial contraction) 01/09/2011    Family history of diabetes mellitus 10/13/2010    Hypertension 10/13/2010 Mitral valve prolapse 10/13/2010    Menopause 10/13/2010     Past Surgical History:   Procedure Laterality Date    BREAST ENHANCEMENT SURGERY Bilateral     91, 00, 04    COLONOSCOPY  2013    adenomatous polyp 2-13 for 5 yr marva    COLONOSCOPY  2/26/18  repeat x 5  yrs.     DILATION AND CURETTAGE OF UTERUS       Family History   Problem Relation Age of Onset    Thyroid Disease Mother     High Cholesterol Mother     Hypertension Mother     Other Father         Atherosclerotic Disease; Spastic paraparesis of legs     No Known Problems Brother     No Known Problems Son     No Known Problems Daughter      Social History     Socioeconomic History    Marital status:      Spouse name: None    Number of children: None    Years of education: None    Highest education level: None   Tobacco Use    Smoking status: Never    Smokeless tobacco: Never   Substance and Sexual Activity    Alcohol use: Not Currently     Comment: social    Drug use: No    Sexual activity: Yes     Partners: Male     Social Determinants of Health     Physical Activity: Sufficiently Active    Days of Exercise per Week: 3 days    Minutes of Exercise per Session: 50 min   Intimate Partner Violence: Not At Risk    Fear of Current or Ex-Partner: No    Emotionally Abused: No    Physically Abused: No    Sexually Abused: No     Current Outpatient Medications   Medication Sig Dispense Refill    vitamin D (CHOLECALCIFEROL) 25 MCG (1000 UT) TABS tablet Take 1,000 Units by mouth daily      Multiple Vitamins-Minerals (THERAPEUTIC MULTIVITAMIN-MINERALS) tablet Take 1 tablet by mouth daily      zinc gluconate 50 MG tablet Take 50 mg by mouth daily      vitamin C (ASCORBIC ACID) 500 MG tablet Take 500 mg by mouth daily      calcium carbonate (OSCAL) 500 MG TABS tablet Take 500 mg by mouth daily      levothyroxine (SYNTHROID) 75 MCG tablet TAKE 1 TABLET BY MOUTH 6 DAYS A WEEK, THEN 1 1/2 TABLETS BY MOUTH 1 DAY A WEEK 35 tablet 11    liothyronine (CYTOMEL) 5 MCG tablet TAKE 1 TABLET BY MOUTH DAILY 30 tablet 11    nadolol (CORGARD) 20 MG tablet TAKE 1 TABLET BY MOUTH IN THE MORNING 90 tablet 0    omeprazole 20 MG EC tablet TAKE 1 TABLET BY MOUTH TWICE DAILY FOR 2 WEEKS      norethindrone-ethinyl estradiol (JINTELI) 1-5 MG-MCG TABS per tablet Take 1 tablet by mouth daily      naproxen (NAPROSYN) 250 MG tablet Take 1 tablet by mouth 2 times daily as needed for Pain 14 tablet 0    ANTACID 200-200-20 MG/5ML SUSP suspension SHAKE LIQUID AND TAKE 10 ML BY MOUTH FOUR TIMES DAILY AS NEEDED FOR INDIGESTION (Patient not taking: No sig reported)       No current facility-administered medications for this visit. No Known Allergies  Family Status   Relation Name Status    Mother  Alive        HTN,chronic renal disease,xsmoker    Father          HTN-paraplegic after fastpitch injury age 13!     PGM          ASCAD/pancreatic ca    MGM          DM    PGF          alzheimers    MGF      Brother  Alive    Son  Alive    Ricardo  Alive       Review of Systems:  Constitutional: has fatigue, no fever, no recent weight gain, no recent weight loss, no changes in appetite, hard to lose weight  Eyes: no eye pain, has change in vision, no eye redness, has eye irritation, no double vision  Ears, nose, throat: no nasal congestion, no sore throat, no earache, no decrease in hearing, no hoarseness, no dry mouth, no sinus problems, no difficulty swallowing, no neck lumps, no dental problems, no mouth sores, no ringing in ears  Pulmonary: no shortness of breath, no wheezing, no dyspnea on exertion, no cough  Cardiovascular: no chest pain, no lower extremity edema, no orthopnea, no intermittent leg claudication, has palpitations  Gastrointestinal: no abdominal pain, no nausea, no vomiting, no diarrhea, no constipation, no dysphagia, no heartburn, no bloating  Genitourinary: no dysuria, no urinary incontinence, no urinary hesitancy, no urinary frequency, no feelings of urinary urgency, has nocturia  Musculoskeletal: no joint swelling, no joint stiffness, has joint pain, no muscle cramps, no muscle pain, no bone pain  Integument/Breast: no hair loss, no skin rashes, no skin lesions, no itching, no dry skin  Neurological: no numbness, no tingling, no weakness, no confusion, no headaches, no dizziness, no fainting, no tremors, no decrease in memory, no balance problems  Psychiatric: has anxiety, has depression, has insomnia  Hematologic/Lymphatic: no tendency for easy bleeding, no swollen lymph nodes, no tendency for easy bruising  Immunology: has seasonal allergies, no frequent infections, no frequent illnesses  Endocrine: no temperature intolerance, has hot flashes    /64   Pulse 74   Temp 98 °F (36.7 °C)   Resp 14   Ht 5' 7\" (1.702 m)   Wt 176 lb (79.8 kg)   SpO2 99%   BMI 27.57 kg/m²    Wt Readings from Last 3 Encounters:   01/16/23 176 lb (79.8 kg)   12/21/22 170 lb (77.1 kg)   12/08/22 176 lb (79.8 kg)     Body mass index is 27.57 kg/m².     OBJECTIVE:  Constitutional: no acute distress, well appearing and well nourished  Psychiatric: oriented to person, place and time, judgement and insight and normal, recent and remote memory and intact and mood and affect are normal  Skin: skin and subcutaneous tissue is normal without mass, normal turgor  Head and Face: examination of head and face revealed no abnormalities  Eyes: no lid or conjunctival swelling, erythema or discharge, pupils are normal, equal, round, reactive to light  Ears/Nose: external inspection of ears and nose revealed no abnormalities, hearing is grossly normal  Oropharynx/Mouth/Face: lips, tongue and gums are normal with no lesions, the voice quality was normal  Neck: neck is supple and symmetric, with midline trachea and no masses, thyroid is normal  Lymphatics: normal cervical lymph nodes, normal supraclavicular nodes  Pulmonary: no increased work of breathing or signs of respiratory distress, lungs are clear to auscultation  Cardiovascular: normal heart rate and rhythm, normal S1 and S2, no murmurs and pedal pulses and 2+ bilaterally, No edema  Abdomen: abdomen is soft, non-tender with no masses  Musculoskeletal: normal gait and station and exam of the digits and nails are normal  Neurological: normal coordination and normal general cortical function      Lab Review:    Lab Results   Component Value Date/Time    WBC 4.8 07/08/2022 08:05 AM    HGB 14.4 07/08/2022 08:05 AM    HCT 42.1 07/08/2022 08:05 AM    MCV 96.4 07/08/2022 08:05 AM     07/08/2022 08:05 AM     Lab Results   Component Value Date/Time     01/10/2023 07:52 AM    K 4.4 01/10/2023 07:52 AM     01/10/2023 07:52 AM    CO2 24 01/10/2023 07:52 AM    BUN 12 01/10/2023 07:52 AM    CREATININE 0.9 01/10/2023 07:52 AM    GLUCOSE 92 01/10/2023 07:52 AM    GLUCOSE 76 12/14/2011 11:29 AM    CALCIUM 9.4 01/10/2023 07:52 AM    PROT 6.5 01/10/2023 07:52 AM    PROT 6.9 02/22/2013 03:39 PM    LABALBU 4.1 01/10/2023 07:52 AM    BILITOT 0.5 01/10/2023 07:52 AM    ALKPHOS 60 01/10/2023 07:52 AM    AST 16 01/10/2023 07:52 AM    ALT 12 01/10/2023 07:52 AM    LABGLOM >60 01/10/2023 07:52 AM    GFRAA >60 07/08/2022 08:05 AM    AGRATIO 1.7 01/10/2023 07:52 AM    GLOB 2.7 09/10/2021 08:59 AM     Lab Results   Component Value Date/Time    TSH 1.39 01/10/2023 07:52 AM    FT3 4.3 01/10/2023 07:52 AM     Lab Results   Component Value Date/Time    LABA1C 4.7 01/10/2023 07:52 AM     Lab Results   Component Value Date/Time    EAG 88.2 01/10/2023 07:52 AM     Lab Results   Component Value Date/Time    CHOL 157 01/06/2022 07:46 AM     Lab Results   Component Value Date/Time    TRIG 87 01/06/2022 07:46 AM     Lab Results   Component Value Date/Time    HDL 54 01/06/2022 07:46 AM     Lab Results   Component Value Date/Time    LDLCALC 86 01/06/2022 07:46 AM     Lab Results   Component Value Date/Time    LABVLDL 17 01/06/2022 07:46 AM     Lab Results   Component Value Date/Time    CHOLHDLRATIO 2.7 10/24/2013 07:46 AM     Lab Results   Component Value Date/Time    LABMICR <0.2 11/19/2010 11:44 AM     Lab Results   Component Value Date/Time    VITD25 41.7 01/10/2023 07:52 AM        ASSESSMENT/PLAN:  1. Acquired hypothyroidism  TSH 2.5-1.4-2.1-3.11-2.55-2.05-1.56-1.7-1.39  Continue Levothyroxine 0.075 mg 6 days a week, one and a half tablet 1 day a week  Continue Liothyronine 5 mcg to 1 tabs daily.  - TSH without Reflex; Future  - T4, Free; Future  - T3, Free; Future    2. Menopause  Calcium, vitamin D  - DEXA Bone Density Axial Skeleton; Future    3. Osteopenia, unspecified location  Continue monitoring  Discussed medications  Mother has Osteoporosis  11/2022 DEXA spine unchanged, left total hip decreased 4.2%, right unchanged. Worst T-score -1.8 left femoral neck  Major osteoporotic fracture: 15%       Hip fracture: 1.6%     Calcium, vitamin D  - DEXA Bone Density Axial Skeleton; Future    4. Vitamin D deficiency  25 hydroxy vitamin D 29-37- 40.4-31.8-40.7-31.2-42.7-41.7  Vitamin D supplement 2000 IU qd plus MVI    5. IFG   Glucose [id number]  Hemoglobin A1c 5.0-4.7-4.9-5.1    Reviewed and/or ordered clinical lab results Yes  Reviewed and/or ordered radiology tests Yes   Reviewed and/or ordered other diagnostic tests No  Discussed test results with performing physician No  Independently reviewed image, tracing, or specimen No  Made a decision to obtain old records No  Reviewed and summarized old records Yes  Obtained history from other than patient No    Vy Phoenixsabra was counseled regarding symptoms of thyroid, osteopenia, vitamin D deficiency diagnosis, course and complications of disease if inadequately treated, side effects of medications, diagnosis, treatment options, and prognosis, risks, benefits, complications, and alternatives of treatment, labs, imaging and other studies and treatment targets and goals.   She understands instructions and counseling. Return in about 1 year (around 1/16/2024) for thyroid problems.

## 2023-01-25 ENCOUNTER — OFFICE VISIT (OUTPATIENT)
Dept: ORTHOPEDIC SURGERY | Age: 63
End: 2023-01-25

## 2023-01-25 VITALS — BODY MASS INDEX: 26.68 KG/M2 | HEIGHT: 67 IN | WEIGHT: 170 LBS

## 2023-01-25 DIAGNOSIS — S46.812A STRAIN OF LEFT DELTOID MUSCLE, INITIAL ENCOUNTER: Primary | ICD-10-CM

## 2023-03-20 RX ORDER — NADOLOL 20 MG/1
20 TABLET ORAL DAILY
Qty: 90 TABLET | Refills: 0 | Status: SHIPPED | OUTPATIENT
Start: 2023-03-20 | End: 2023-04-19

## 2023-03-20 NOTE — TELEPHONE ENCOUNTER
Medication:   Requested Prescriptions     Pending Prescriptions Disp Refills    nadolol (CORGARD) 20 MG tablet [Pharmacy Med Name: NADOLOL 20MG TABLETS] 90 tablet 0     Sig: TAKE 1 TABLET BY MOUTH IN THE MORNING        Last Filled: 12/29/2022  Last appt: 12/8/2022   Next appt: NONE

## 2023-06-26 ENCOUNTER — HOSPITAL ENCOUNTER (OUTPATIENT)
Dept: MAMMOGRAPHY | Age: 63
Discharge: HOME | End: 2023-06-26
Payer: COMMERCIAL

## 2023-06-26 DIAGNOSIS — Z12.31 VISIT FOR SCREENING MAMMOGRAM: ICD-10-CM

## 2023-06-26 PROCEDURE — 77063 BREAST TOMOSYNTHESIS BI: CPT

## 2023-06-26 RX ORDER — NADOLOL 20 MG/1
20 TABLET ORAL DAILY
Qty: 90 TABLET | Refills: 0 | Status: SHIPPED | OUTPATIENT
Start: 2023-06-26 | End: 2023-07-26

## 2023-09-14 NOTE — TELEPHONE ENCOUNTER
Medication:   Requested Prescriptions     Pending Prescriptions Disp Refills    nadolol (CORGARD) 20 MG tablet [Pharmacy Med Name: NADOLOL 20MG TABLETS] 90 tablet 0     Sig: TAKE 1 TABLET BY MOUTH IN THE MORNING        Last Filled:  6/26/23    Patient Phone Number: [phone] (home)     Last appt: 12/8/2022   Next appt: Visit date not found    Last OARRS:        No data to display

## 2023-09-15 RX ORDER — NADOLOL 20 MG/1
20 TABLET ORAL DAILY
Qty: 90 TABLET | Refills: 0 | Status: SHIPPED | OUTPATIENT
Start: 2023-09-15 | End: 2023-10-15

## 2024-01-17 DIAGNOSIS — E03.9 ACQUIRED HYPOTHYROIDISM: ICD-10-CM

## 2024-01-18 RX ORDER — LEVOTHYROXINE SODIUM 0.07 MG/1
TABLET ORAL
Qty: 35 TABLET | Refills: 5 | Status: SHIPPED | OUTPATIENT
Start: 2024-01-18

## 2024-01-28 DIAGNOSIS — E03.9 ACQUIRED HYPOTHYROIDISM: ICD-10-CM

## 2024-01-29 RX ORDER — LIOTHYRONINE SODIUM 5 UG/1
TABLET ORAL
Qty: 30 TABLET | Refills: 5 | Status: SHIPPED | OUTPATIENT
Start: 2024-01-29

## 2024-01-29 RX ORDER — NADOLOL 20 MG/1
20 TABLET ORAL DAILY
Qty: 90 TABLET | Refills: 0 | Status: SHIPPED | OUTPATIENT
Start: 2024-01-29 | End: 2024-04-28

## 2024-01-29 NOTE — TELEPHONE ENCOUNTER
Medication:   Requested Prescriptions     Pending Prescriptions Disp Refills    nadolol (CORGARD) 20 MG tablet [Pharmacy Med Name: NADOLOL 20MG TABLETS] 90 tablet 0     Sig: TAKE 1 TABLET BY MOUTH IN THE MORNING        Last Filled:  9/15/23    Patient Phone Number: [phone] (home)     Last appt: 12/8/2022   Next appt: 3/4/2024    Last OARRS:        No data to display

## 2024-03-02 SDOH — ECONOMIC STABILITY: TRANSPORTATION INSECURITY
IN THE PAST 12 MONTHS, HAS LACK OF TRANSPORTATION KEPT YOU FROM MEETINGS, WORK, OR FROM GETTING THINGS NEEDED FOR DAILY LIVING?: NO

## 2024-03-02 SDOH — ECONOMIC STABILITY: INCOME INSECURITY: HOW HARD IS IT FOR YOU TO PAY FOR THE VERY BASICS LIKE FOOD, HOUSING, MEDICAL CARE, AND HEATING?: NOT HARD AT ALL

## 2024-03-02 SDOH — ECONOMIC STABILITY: HOUSING INSECURITY
IN THE LAST 12 MONTHS, WAS THERE A TIME WHEN YOU DID NOT HAVE A STEADY PLACE TO SLEEP OR SLEPT IN A SHELTER (INCLUDING NOW)?: NO

## 2024-03-02 SDOH — ECONOMIC STABILITY: FOOD INSECURITY: WITHIN THE PAST 12 MONTHS, THE FOOD YOU BOUGHT JUST DIDN'T LAST AND YOU DIDN'T HAVE MONEY TO GET MORE.: NEVER TRUE

## 2024-03-02 SDOH — ECONOMIC STABILITY: FOOD INSECURITY: WITHIN THE PAST 12 MONTHS, YOU WORRIED THAT YOUR FOOD WOULD RUN OUT BEFORE YOU GOT MONEY TO BUY MORE.: NEVER TRUE

## 2024-03-02 ASSESSMENT — PATIENT HEALTH QUESTIONNAIRE - PHQ9
SUM OF ALL RESPONSES TO PHQ QUESTIONS 1-9: 0
SUM OF ALL RESPONSES TO PHQ QUESTIONS 1-9: 0
2. FEELING DOWN, DEPRESSED OR HOPELESS: 0
SUM OF ALL RESPONSES TO PHQ9 QUESTIONS 1 & 2: 0
SUM OF ALL RESPONSES TO PHQ QUESTIONS 1-9: 0
2. FEELING DOWN, DEPRESSED OR HOPELESS: NOT AT ALL
SUM OF ALL RESPONSES TO PHQ9 QUESTIONS 1 & 2: 0
1. LITTLE INTEREST OR PLEASURE IN DOING THINGS: NOT AT ALL
SUM OF ALL RESPONSES TO PHQ QUESTIONS 1-9: 0
1. LITTLE INTEREST OR PLEASURE IN DOING THINGS: 0

## 2024-03-04 ENCOUNTER — OFFICE VISIT (OUTPATIENT)
Dept: FAMILY MEDICINE CLINIC | Age: 64
End: 2024-03-04
Payer: COMMERCIAL

## 2024-03-04 VITALS
BODY MASS INDEX: 27.47 KG/M2 | HEIGHT: 67 IN | SYSTOLIC BLOOD PRESSURE: 126 MMHG | OXYGEN SATURATION: 98 % | DIASTOLIC BLOOD PRESSURE: 64 MMHG | WEIGHT: 175 LBS | HEART RATE: 70 BPM

## 2024-03-04 DIAGNOSIS — Z00.00 WELL ADULT EXAM: Primary | ICD-10-CM

## 2024-03-04 PROCEDURE — 99396 PREV VISIT EST AGE 40-64: CPT | Performed by: FAMILY MEDICINE

## 2024-03-04 PROCEDURE — 3078F DIAST BP <80 MM HG: CPT | Performed by: FAMILY MEDICINE

## 2024-03-04 PROCEDURE — 3074F SYST BP LT 130 MM HG: CPT | Performed by: FAMILY MEDICINE

## 2024-03-04 RX ORDER — NADOLOL 20 MG/1
20 TABLET ORAL DAILY
Qty: 90 TABLET | Refills: 3 | Status: SHIPPED | OUTPATIENT
Start: 2024-03-04

## 2024-03-04 SDOH — ECONOMIC STABILITY: INCOME INSECURITY: HOW HARD IS IT FOR YOU TO PAY FOR THE VERY BASICS LIKE FOOD, HOUSING, MEDICAL CARE, AND HEATING?: NOT HARD AT ALL

## 2024-03-04 SDOH — ECONOMIC STABILITY: FOOD INSECURITY: WITHIN THE PAST 12 MONTHS, YOU WORRIED THAT YOUR FOOD WOULD RUN OUT BEFORE YOU GOT MONEY TO BUY MORE.: NEVER TRUE

## 2024-03-04 SDOH — ECONOMIC STABILITY: FOOD INSECURITY: WITHIN THE PAST 12 MONTHS, THE FOOD YOU BOUGHT JUST DIDN'T LAST AND YOU DIDN'T HAVE MONEY TO GET MORE.: NEVER TRUE

## 2024-03-04 ASSESSMENT — ENCOUNTER SYMPTOMS: RESPIRATORY NEGATIVE: 1

## 2024-03-04 NOTE — PROGRESS NOTES
No submandibular adenopathy.      Cervical: No cervical adenopathy.      Upper Body:      Right upper body: No supraclavicular adenopathy.      Left upper body: No supraclavicular adenopathy.   Skin:     General: Skin is warm and dry.      Capillary Refill: Capillary refill takes less than 2 seconds.      Nails: There is no clubbing.   Neurological:      Mental Status: She is alert and oriented to person, place, and time.      Cranial Nerves: No cranial nerve deficit.      Deep Tendon Reflexes:      Reflex Scores:       Bicep reflexes are 2+ on the right side and 2+ on the left side.       Patellar reflexes are 2+ on the right side and 2+ on the left side.  Psychiatric:         Behavior: Behavior normal.              An electronic signature was used to authenticate this note.    --Rafal Brooks MD

## 2024-04-01 RX ORDER — NADOLOL 20 MG/1
20 TABLET ORAL EVERY MORNING
Qty: 90 TABLET | Refills: 3 | Status: SHIPPED | OUTPATIENT
Start: 2024-04-01

## 2024-04-01 NOTE — TELEPHONE ENCOUNTER
Medication:   Requested Prescriptions     Pending Prescriptions Disp Refills    nadolol (CORGARD) 20 MG tablet [Pharmacy Med Name: NADOLOL 20MG TABLETS] 90 tablet 3     Sig: TAKE 1 TABLET BY MOUTH IN THE MORNING        Last Filled:      Patient Phone Number: [phone] (home)     Last appt: 3/4/2024   Next appt: Visit date not found    Last OARRS:        No data to display

## 2024-06-26 ENCOUNTER — OFFICE VISIT (OUTPATIENT)
Dept: FAMILY MEDICINE CLINIC | Age: 64
End: 2024-06-26
Payer: COMMERCIAL

## 2024-06-26 VITALS
BODY MASS INDEX: 27.47 KG/M2 | SYSTOLIC BLOOD PRESSURE: 124 MMHG | HEART RATE: 64 BPM | HEIGHT: 67 IN | WEIGHT: 175 LBS | DIASTOLIC BLOOD PRESSURE: 62 MMHG | OXYGEN SATURATION: 98 %

## 2024-06-26 DIAGNOSIS — R53.83 OTHER FATIGUE: ICD-10-CM

## 2024-06-26 DIAGNOSIS — R00.2 PALPITATIONS: Primary | ICD-10-CM

## 2024-06-26 DIAGNOSIS — R00.2 PALPITATIONS: ICD-10-CM

## 2024-06-26 LAB
ALBUMIN SERPL-MCNC: 4 G/DL (ref 3.4–5)
ALBUMIN/GLOB SERPL: 1.3 {RATIO} (ref 1.1–2.2)
ALP SERPL-CCNC: 78 U/L (ref 40–129)
ALT SERPL-CCNC: 13 U/L (ref 10–40)
ANION GAP SERPL CALCULATED.3IONS-SCNC: 10 MMOL/L (ref 3–16)
AST SERPL-CCNC: 17 U/L (ref 15–37)
BASOPHILS # BLD: 0.1 K/UL (ref 0–0.2)
BASOPHILS NFR BLD: 0.9 %
BILIRUB SERPL-MCNC: 0.3 MG/DL (ref 0–1)
BUN SERPL-MCNC: 15 MG/DL (ref 7–20)
CALCIUM SERPL-MCNC: 9.1 MG/DL (ref 8.3–10.6)
CHLORIDE SERPL-SCNC: 108 MMOL/L (ref 99–110)
CO2 SERPL-SCNC: 25 MMOL/L (ref 21–32)
CREAT SERPL-MCNC: 1 MG/DL (ref 0.6–1.2)
DEPRECATED RDW RBC AUTO: 12.5 % (ref 12.4–15.4)
EOSINOPHIL # BLD: 0.1 K/UL (ref 0–0.6)
EOSINOPHIL NFR BLD: 1.6 %
GFR SERPLBLD CREATININE-BSD FMLA CKD-EPI: 64 ML/MIN/{1.73_M2}
GLUCOSE SERPL-MCNC: 108 MG/DL (ref 70–99)
HCT VFR BLD AUTO: 40.5 % (ref 36–48)
HGB BLD-MCNC: 14.2 G/DL (ref 12–16)
LYMPHOCYTES # BLD: 2.2 K/UL (ref 1–5.1)
LYMPHOCYTES NFR BLD: 31.8 %
MCH RBC QN AUTO: 33.4 PG (ref 26–34)
MCHC RBC AUTO-ENTMCNC: 35.1 G/DL (ref 31–36)
MCV RBC AUTO: 95.2 FL (ref 80–100)
MONOCYTES # BLD: 0.5 K/UL (ref 0–1.3)
MONOCYTES NFR BLD: 7.8 %
NEUTROPHILS # BLD: 4 K/UL (ref 1.7–7.7)
NEUTROPHILS NFR BLD: 57.9 %
PLATELET # BLD AUTO: 274 K/UL (ref 135–450)
PMV BLD AUTO: 10.2 FL (ref 5–10.5)
POTASSIUM SERPL-SCNC: 4.3 MMOL/L (ref 3.5–5.1)
PROT SERPL-MCNC: 7 G/DL (ref 6.4–8.2)
RBC # BLD AUTO: 4.25 M/UL (ref 4–5.2)
SODIUM SERPL-SCNC: 143 MMOL/L (ref 136–145)
TROPONIN, HIGH SENSITIVITY: 10 NG/L (ref 0–14)
TSH SERPL DL<=0.005 MIU/L-ACNC: 1.42 UIU/ML (ref 0.27–4.2)
WBC # BLD AUTO: 7 K/UL (ref 4–11)

## 2024-06-26 PROCEDURE — 99214 OFFICE O/P EST MOD 30 MIN: CPT | Performed by: NURSE PRACTITIONER

## 2024-06-26 PROCEDURE — 3078F DIAST BP <80 MM HG: CPT | Performed by: NURSE PRACTITIONER

## 2024-06-26 PROCEDURE — 3074F SYST BP LT 130 MM HG: CPT | Performed by: NURSE PRACTITIONER

## 2024-06-26 PROCEDURE — 93000 ELECTROCARDIOGRAM COMPLETE: CPT | Performed by: NURSE PRACTITIONER

## 2024-06-26 ASSESSMENT — ENCOUNTER SYMPTOMS
CHOKING: 0
WHEEZING: 0
SORE THROAT: 0
CONSTIPATION: 0
PHOTOPHOBIA: 0
BLOOD IN STOOL: 0
VOICE CHANGE: 0
VOMITING: 0
TROUBLE SWALLOWING: 0
SINUS PAIN: 0
COUGH: 0
STRIDOR: 0
RHINORRHEA: 0
NAUSEA: 0
SHORTNESS OF BREATH: 0
COLOR CHANGE: 0
CHEST TIGHTNESS: 0
EYE DISCHARGE: 0
EYE ITCHING: 0
BACK PAIN: 0
EYE PAIN: 0
EYE REDNESS: 0
ABDOMINAL PAIN: 0
DIARRHEA: 0
SINUS PRESSURE: 0

## 2024-06-26 NOTE — PROGRESS NOTES
Chief Complaint   Patient presents with    Other     Has had fluttering in heart, Monday morning     Diagnosed with pac    Needs new cardiologist     Tired a lot last several months       /62   Pulse 64   Ht 1.702 m (5' 7\")   Wt 79.4 kg (175 lb)   SpO2 98%   BMI 27.41 kg/m²       EKG:  Sinus lorena with anteroseptal infarct- age undetermined      ASSESSMENT/PLAN:    1. Palpitations    - TSH with Reflex; Future  - CBC with Auto Differential; Future  - Comprehensive Metabolic Panel; Future  - EKG 12 Lead  - Cardiac holter monitor (1 day-2 day); Future  - Tj Mejia MD, Cardiology, [facility]    2. Other fatigue    - TSH with Reflex; Future  - CBC with Auto Differential; Future  - Comprehensive Metabolic Panel; Future  - EKG 12 Lead  - Cardiac holter monitor (1 day-2 day); Future  - Tj Mejia MD, Cardiology, [facility]      Will follow up with lab results. Reviewed EKG in office with pt.           HPI:  Renuka ROMAN Mendez is a 63 y.o. (: 1960) here today   for   HPI    Patient's medications, allergies, past medical, surgical, social and family histories were reviewed and updated as appropriate.    Palpitations: Has had in past, in 80's had MVP, then was told not prolapse valve, on nadalol, low dose to help with symptoms. Having more episode of quick fluttering more often, twice a month., last episode Monday morning. Mostly when sitting still or laying down.  More fatigued past few months. Used to see cardiology.       ROS:   Review of Systems   Constitutional:  Positive for fatigue. Negative for activity change, appetite change, chills, diaphoresis, fever and unexpected weight change.   HENT:  Negative for congestion, ear discharge, ear pain, hearing loss, nosebleeds, postnasal drip, rhinorrhea, sinus pressure, sinus pain, sneezing, sore throat, tinnitus, trouble swallowing and voice change.    Eyes:  Negative for photophobia, pain, discharge, redness and itching.   Respiratory:

## 2024-06-27 ENCOUNTER — HOSPITAL ENCOUNTER (OUTPATIENT)
Dept: MAMMOGRAPHY | Age: 64
Discharge: HOME | End: 2024-06-27
Payer: COMMERCIAL

## 2024-06-27 DIAGNOSIS — Z12.31 ENCOUNTER FOR SCREENING MAMMOGRAM FOR BREAST CANCER: ICD-10-CM

## 2024-06-27 PROCEDURE — 77063 BREAST TOMOSYNTHESIS BI: CPT

## 2024-06-30 DIAGNOSIS — E03.9 ACQUIRED HYPOTHYROIDISM: ICD-10-CM

## 2024-07-01 ENCOUNTER — HOSPITAL ENCOUNTER (OUTPATIENT)
Age: 64
Discharge: HOME | End: 2024-07-03
Payer: COMMERCIAL

## 2024-07-01 ENCOUNTER — TELEPHONE (OUTPATIENT)
Dept: FAMILY MEDICINE CLINIC | Age: 64
End: 2024-07-01

## 2024-07-01 DIAGNOSIS — R00.2 PALPITATIONS: ICD-10-CM

## 2024-07-01 DIAGNOSIS — R53.83 OTHER FATIGUE: ICD-10-CM

## 2024-07-01 PROCEDURE — 93225 XTRNL ECG REC<48 HRS REC: CPT

## 2024-07-01 RX ORDER — LIOTHYRONINE SODIUM 5 UG/1
TABLET ORAL
Qty: 30 TABLET | Refills: 5 | Status: SHIPPED | OUTPATIENT
Start: 2024-07-01

## 2024-07-01 RX ORDER — LEVOTHYROXINE SODIUM 0.07 MG/1
TABLET ORAL
Qty: 35 TABLET | Refills: 5 | Status: SHIPPED | OUTPATIENT
Start: 2024-07-01

## 2024-07-01 NOTE — TELEPHONE ENCOUNTER
Pt called and was given her lab results and she said she was not fasting for the elevated glucose.    She did say she got the Holter monitor today and she will call and camilla with Cardiology.

## 2024-07-11 LAB
ACQUISITION DURATION: NORMAL S
AVERAGE HEART RATE: 56 BPM
HOOKUP DATE: NORMAL
HOOKUP TIME: NORMAL
MAX HEART RATE TIME/DATE: NORMAL
MAX HEART RATE: 96 BPM
MIN HEART RATE TIME/DATE: NORMAL
MIN HEART RATE: 42 BPM
NUMBER OF QRS COMPLEXES: NORMAL
NUMBER OF SUPRAVENTRICULAR COUPLETS: 3
NUMBER OF SUPRAVENTRICULAR ECTOPICS: 691
NUMBER OF SUPRAVENTRICULAR ISOLATED BEATS: 560
NUMBER OF VENTRICULAR BIGEMINAL CYCLES: 0
NUMBER OF VENTRICULAR COUPLETS: 0
NUMBER OF VENTRICULAR ECTOPICS: 8

## 2024-07-23 NOTE — TELEPHONE ENCOUNTER
Left voicemail for patient to return our call.      Patient is requesting medication refill on   nadolol (CORGARD) 20 MG tablet     [id number] [place], [address] [phone] - F [phone]

## 2024-08-09 ENCOUNTER — OFFICE VISIT (OUTPATIENT)
Dept: CARDIOLOGY CLINIC | Age: 64
End: 2024-08-09

## 2024-08-09 VITALS
SYSTOLIC BLOOD PRESSURE: 130 MMHG | HEART RATE: 57 BPM | DIASTOLIC BLOOD PRESSURE: 66 MMHG | BODY MASS INDEX: 27.31 KG/M2 | WEIGHT: 174.4 LBS

## 2024-08-09 DIAGNOSIS — I47.19 PAROXYSMAL ATRIAL TACHYCARDIA (HCC): ICD-10-CM

## 2024-08-09 DIAGNOSIS — R00.2 PALPITATIONS: Primary | ICD-10-CM

## 2024-08-09 RX ORDER — METOPROLOL SUCCINATE 25 MG/1
25 TABLET, EXTENDED RELEASE ORAL DAILY
Qty: 90 TABLET | Refills: 3 | Status: SHIPPED | OUTPATIENT
Start: 2024-08-09

## 2024-08-09 NOTE — PROGRESS NOTES
Cc: Palpitations, PAT    HPI:     Patient is 64-year-old woman with history of palpitations, thyroid abnormalities (follows with endocrinology).    Patient used to follow with Dr Bhakta at [facility] for her symptoms, lastly seen in 2015.  At that time she had an echo and a monitor.  Her symptoms were thought to be due to PACs.  Her propranolol was changed to nadolol 20 mg daily and her symptoms resolved.    Echo 06/2015: Normal    Patient tested the clinic today with worsening of her symptoms.  She reports that since the beginning of this year she has been having daily symptoms, multiple times during the day.  She denies any ischemic or congestive symptoms.  TSH 06/2024 normal.  Patient used to drink 1 to 2 cups of coffee per day, switched to decaf last month.  She has not noticed any significant improvement with her symptoms.    ECG 06/2024: NSR, LAE, poor R wave progression.    2-day Holter 07/2024: NSR, 1 PAT of 10 beats and 2 of 3 beats each    Histories     Past Medical History:   has a past medical history of Bilateral closed proximal tibial stress fracture, Chondromalacia patellae of left knee, Foot pain, Hypothyroidism, Menopausal state, MVP (mitral valve prolapse), Nondisplaced fracture of fifth metatarsal bone, left foot, initial encounter for closed fracture, Stress fracture of tibia, Tear of medial meniscus of right knee, and Tendonitis, tibialis.    Surgical History:   has a past surgical history that includes Dilation and curettage of uterus; Colonoscopy (2013); Colonoscopy (2/26/18  repeat x 5  yrs.); and Breast enhancement surgery (Bilateral).     Social History:   reports that she has never smoked. She has never used smokeless tobacco. She reports current alcohol use of about 3.0 standard drinks of alcohol per week. She reports that she does not use drugs.     Family History:  No evidence for sudden cardiac death or premature CAD      Medications:     Home medications were reviewed and are listed

## 2024-08-09 NOTE — PATIENT INSTRUCTIONS
PLEASE CALL [facility] SCHEDULING [phone] TO MAKE AN APPOINTMENT FOR YOUR TESTING - echo cardiogram     Stop nadolol  Start toprol 25 mg once daily

## 2024-09-13 ENCOUNTER — OFFICE VISIT (OUTPATIENT)
Dept: ENDOCRINOLOGY | Age: 64
End: 2024-09-13
Payer: COMMERCIAL

## 2024-09-13 VITALS
OXYGEN SATURATION: 98 % | HEART RATE: 52 BPM | BODY MASS INDEX: 27.47 KG/M2 | SYSTOLIC BLOOD PRESSURE: 149 MMHG | WEIGHT: 175 LBS | RESPIRATION RATE: 14 BRPM | TEMPERATURE: 98 F | DIASTOLIC BLOOD PRESSURE: 71 MMHG | HEIGHT: 67 IN

## 2024-09-13 DIAGNOSIS — Z78.0 MENOPAUSE: ICD-10-CM

## 2024-09-13 DIAGNOSIS — E03.9 ACQUIRED HYPOTHYROIDISM: Primary | ICD-10-CM

## 2024-09-13 DIAGNOSIS — M85.80 OSTEOPENIA, UNSPECIFIED LOCATION: ICD-10-CM

## 2024-09-13 DIAGNOSIS — R73.01 IFG (IMPAIRED FASTING GLUCOSE): ICD-10-CM

## 2024-09-13 DIAGNOSIS — E03.9 ACQUIRED HYPOTHYROIDISM: ICD-10-CM

## 2024-09-13 DIAGNOSIS — E55.9 VITAMIN D DEFICIENCY: ICD-10-CM

## 2024-09-13 LAB
25(OH)D3 SERPL-MCNC: 44.7 NG/ML
ALBUMIN SERPL-MCNC: 4.3 G/DL (ref 3.4–5)
ALBUMIN/GLOB SERPL: 1.8 {RATIO} (ref 1.1–2.2)
ALP SERPL-CCNC: 68 U/L (ref 40–129)
ALT SERPL-CCNC: 17 U/L (ref 10–40)
ANION GAP SERPL CALCULATED.3IONS-SCNC: 13 MMOL/L (ref 3–16)
AST SERPL-CCNC: 22 U/L (ref 15–37)
BILIRUB SERPL-MCNC: 0.4 MG/DL (ref 0–1)
BUN SERPL-MCNC: 13 MG/DL (ref 7–20)
CALCIUM SERPL-MCNC: 9.7 MG/DL (ref 8.3–10.6)
CHLORIDE SERPL-SCNC: 106 MMOL/L (ref 99–110)
CO2 SERPL-SCNC: 24 MMOL/L (ref 21–32)
CREAT SERPL-MCNC: 1 MG/DL (ref 0.6–1.2)
GFR SERPLBLD CREATININE-BSD FMLA CKD-EPI: 64 ML/MIN/{1.73_M2}
GLUCOSE SERPL-MCNC: 78 MG/DL (ref 70–99)
POTASSIUM SERPL-SCNC: 4.4 MMOL/L (ref 3.5–5.1)
PROT SERPL-MCNC: 6.7 G/DL (ref 6.4–8.2)
SODIUM SERPL-SCNC: 143 MMOL/L (ref 136–145)
T3FREE SERPL-MCNC: 4 PG/ML (ref 2.3–4.2)
T4 FREE SERPL-MCNC: 1.7 NG/DL (ref 0.9–1.8)
TSH SERPL DL<=0.005 MIU/L-ACNC: 1 UIU/ML (ref 0.27–4.2)

## 2024-09-13 PROCEDURE — 3077F SYST BP >= 140 MM HG: CPT | Performed by: INTERNAL MEDICINE

## 2024-09-13 PROCEDURE — 3078F DIAST BP <80 MM HG: CPT | Performed by: INTERNAL MEDICINE

## 2024-09-13 PROCEDURE — 99214 OFFICE O/P EST MOD 30 MIN: CPT | Performed by: INTERNAL MEDICINE

## 2024-09-13 RX ORDER — LEVOTHYROXINE SODIUM 100 UG/1
TABLET ORAL
Qty: 35 TABLET | Refills: 5 | Status: SHIPPED | OUTPATIENT
Start: 2024-09-13

## 2024-09-13 RX ORDER — LIOTHYRONINE SODIUM 5 UG/1
TABLET ORAL
Qty: 30 TABLET | Refills: 5 | Status: CANCELLED | OUTPATIENT
Start: 2024-09-13

## 2024-09-14 ENCOUNTER — TELEPHONE (OUTPATIENT)
Dept: ENDOCRINOLOGY | Age: 64
End: 2024-09-14

## 2024-09-14 LAB
EST. AVERAGE GLUCOSE BLD GHB EST-MCNC: 91.1 MG/DL
HBA1C MFR BLD: 4.8 %

## 2024-09-20 ENCOUNTER — OFFICE VISIT (OUTPATIENT)
Dept: FAMILY MEDICINE CLINIC | Age: 64
End: 2024-09-20
Payer: COMMERCIAL

## 2024-09-20 VITALS
DIASTOLIC BLOOD PRESSURE: 72 MMHG | HEART RATE: 66 BPM | BODY MASS INDEX: 27.62 KG/M2 | WEIGHT: 176 LBS | HEIGHT: 67 IN | SYSTOLIC BLOOD PRESSURE: 128 MMHG | OXYGEN SATURATION: 95 %

## 2024-09-20 DIAGNOSIS — E03.9 ACQUIRED HYPOTHYROIDISM: ICD-10-CM

## 2024-09-20 DIAGNOSIS — Z01.818 PREOP EXAMINATION: Primary | ICD-10-CM

## 2024-09-20 DIAGNOSIS — I10 PRIMARY HYPERTENSION: ICD-10-CM

## 2024-09-20 PROCEDURE — 99213 OFFICE O/P EST LOW 20 MIN: CPT | Performed by: FAMILY MEDICINE

## 2024-09-20 PROCEDURE — 3074F SYST BP LT 130 MM HG: CPT | Performed by: FAMILY MEDICINE

## 2024-09-20 PROCEDURE — 3078F DIAST BP <80 MM HG: CPT | Performed by: FAMILY MEDICINE

## 2024-09-23 ASSESSMENT — ENCOUNTER SYMPTOMS: RESPIRATORY NEGATIVE: 1

## 2024-11-12 ENCOUNTER — OFFICE VISIT (OUTPATIENT)
Dept: CARDIOLOGY CLINIC | Age: 64
End: 2024-11-12
Payer: COMMERCIAL

## 2024-11-12 VITALS
WEIGHT: 179 LBS | HEART RATE: 58 BPM | SYSTOLIC BLOOD PRESSURE: 140 MMHG | DIASTOLIC BLOOD PRESSURE: 78 MMHG | BODY MASS INDEX: 28.04 KG/M2

## 2024-11-12 DIAGNOSIS — R00.2 PALPITATIONS: ICD-10-CM

## 2024-11-12 PROCEDURE — 3078F DIAST BP <80 MM HG: CPT | Performed by: INTERNAL MEDICINE

## 2024-11-12 PROCEDURE — 3077F SYST BP >= 140 MM HG: CPT | Performed by: INTERNAL MEDICINE

## 2024-11-12 PROCEDURE — 99214 OFFICE O/P EST MOD 30 MIN: CPT | Performed by: INTERNAL MEDICINE

## 2024-11-12 RX ORDER — METOPROLOL SUCCINATE 50 MG/1
50 TABLET, EXTENDED RELEASE ORAL DAILY
Qty: 90 TABLET | Refills: 3 | Status: SHIPPED | OUTPATIENT
Start: 2024-11-12

## 2024-11-12 NOTE — PROGRESS NOTES
change in gait, balance, coordination, mood, affect, memory, mentation, behavior.  PSHYCH: No anxiety, loss of interest, change in sexual behavior, feelings of self-harm, or confusion.  ENDOCRINE: No excessive thirst, fluid intake, or urination. No tremor.  HEMATOLOGIC: No abnormal bruising or bleeding.  ALLERGY: No nasal congestion or hives.      Physical Examination:     Vitals:    11/12/24 1201   BP: (!) 140/78   Pulse: 58   Weight: 81.2 kg (179 lb)       Wt Readings from Last 3 Encounters:   11/12/24 81.2 kg (179 lb)   09/20/24 79.8 kg (176 lb)   09/13/24 79.4 kg (175 lb)         General Appearance:  Alert, cooperative, no distress, appears stated age Appropriate weight   Head:  Normocephalic, without obvious abnormality, atraumatic   Neck: Supple, symmetrical, trachea midline, no adenopathy, thyroid: not enlarged, symmetric, no tenderness/mass/nodules, no carotid bruit   Lungs:   Clear to auscultation bilaterally, respirations unlabored   Chest Wall:  No tenderness or deformity   Heart:  Regular rhythm, rate is controlled, S1, S2 normal, there is no murmur, there is no rub or gallop, cannot assess jvd, no bilateral lower extremity edema   Abdomen:   Soft, non-tender, bowel sounds active all four quadrants,  no masses, no organomegaly       Extremities: Extremities normal, atraumatic, no cyanosis   Pulses: 2+ and symmetric   Skin: Skin color, texture, turgor normal, no rashes or lesions   Pysch: Normal mood and affect   Neurologic: Normal gross motor and sensory exam.  Cranial nerves intact        Labs:     Lab Results   Component Value Date    WBC 7.0 06/26/2024    HGB 14.2 06/26/2024    HCT 40.5 06/26/2024    MCV 95.2 06/26/2024     06/26/2024     Lab Results   Component Value Date     09/13/2024    K 4.4 09/13/2024     09/13/2024    CO2 24 09/13/2024    BUN 13 09/13/2024    CREATININE 1.0 09/13/2024    GLUCOSE 78 09/13/2024    CALCIUM 9.7 09/13/2024    BILITOT 0.4 09/13/2024    ALKPHOS 68

## 2024-12-02 ENCOUNTER — HOSPITAL ENCOUNTER (OUTPATIENT)
Dept: GENERAL RADIOLOGY | Age: 64
Discharge: HOME | End: 2024-12-02
Attending: INTERNAL MEDICINE
Payer: COMMERCIAL

## 2024-12-02 DIAGNOSIS — R73.01 IFG (IMPAIRED FASTING GLUCOSE): ICD-10-CM

## 2024-12-02 DIAGNOSIS — E03.9 ACQUIRED HYPOTHYROIDISM: ICD-10-CM

## 2024-12-02 DIAGNOSIS — M85.80 OSTEOPENIA, UNSPECIFIED LOCATION: ICD-10-CM

## 2024-12-02 DIAGNOSIS — E55.9 VITAMIN D DEFICIENCY: ICD-10-CM

## 2024-12-02 DIAGNOSIS — Z78.0 MENOPAUSE: ICD-10-CM

## 2024-12-02 PROCEDURE — 77080 DXA BONE DENSITY AXIAL: CPT

## 2024-12-08 ENCOUNTER — TELEPHONE (OUTPATIENT)
Dept: ENDOCRINOLOGY | Age: 64
End: 2024-12-08

## 2024-12-09 NOTE — TELEPHONE ENCOUNTER
Please inform patient that DEXA scan showed osteopenia.  Overall appears similar.  However, because she did in different place, exact direct comparison cannot be done.  This time she did at [facility], previously at [facility].  Unclear why patient's changed location.  Will discuss again during appointment.

## 2025-01-12 SDOH — ECONOMIC STABILITY: FOOD INSECURITY: WITHIN THE PAST 12 MONTHS, YOU WORRIED THAT YOUR FOOD WOULD RUN OUT BEFORE YOU GOT MONEY TO BUY MORE.: NEVER TRUE

## 2025-01-12 SDOH — ECONOMIC STABILITY: FOOD INSECURITY: WITHIN THE PAST 12 MONTHS, THE FOOD YOU BOUGHT JUST DIDN'T LAST AND YOU DIDN'T HAVE MONEY TO GET MORE.: NEVER TRUE

## 2025-01-12 SDOH — ECONOMIC STABILITY: TRANSPORTATION INSECURITY
IN THE PAST 12 MONTHS, HAS THE LACK OF TRANSPORTATION KEPT YOU FROM MEDICAL APPOINTMENTS OR FROM GETTING MEDICATIONS?: NO

## 2025-01-12 SDOH — ECONOMIC STABILITY: INCOME INSECURITY: IN THE LAST 12 MONTHS, WAS THERE A TIME WHEN YOU WERE NOT ABLE TO PAY THE MORTGAGE OR RENT ON TIME?: NO

## 2025-01-12 ASSESSMENT — PATIENT HEALTH QUESTIONNAIRE - PHQ9
1. LITTLE INTEREST OR PLEASURE IN DOING THINGS: SEVERAL DAYS
2. FEELING DOWN, DEPRESSED OR HOPELESS: SEVERAL DAYS
SUM OF ALL RESPONSES TO PHQ QUESTIONS 1-9: 2
SUM OF ALL RESPONSES TO PHQ9 QUESTIONS 1 & 2: 2
1. LITTLE INTEREST OR PLEASURE IN DOING THINGS: SEVERAL DAYS
SUM OF ALL RESPONSES TO PHQ QUESTIONS 1-9: 2
SUM OF ALL RESPONSES TO PHQ QUESTIONS 1-9: 2
2. FEELING DOWN, DEPRESSED OR HOPELESS: SEVERAL DAYS
SUM OF ALL RESPONSES TO PHQ9 QUESTIONS 1 & 2: 2
SUM OF ALL RESPONSES TO PHQ QUESTIONS 1-9: 2

## 2025-01-13 ENCOUNTER — OFFICE VISIT (OUTPATIENT)
Dept: FAMILY MEDICINE CLINIC | Age: 65
End: 2025-01-13
Payer: COMMERCIAL

## 2025-01-13 VITALS
BODY MASS INDEX: 27 KG/M2 | HEIGHT: 67 IN | DIASTOLIC BLOOD PRESSURE: 82 MMHG | HEART RATE: 54 BPM | OXYGEN SATURATION: 98 % | WEIGHT: 172 LBS | SYSTOLIC BLOOD PRESSURE: 118 MMHG

## 2025-01-13 DIAGNOSIS — R73.01 IFG (IMPAIRED FASTING GLUCOSE): ICD-10-CM

## 2025-01-13 DIAGNOSIS — Z78.0 MENOPAUSE: ICD-10-CM

## 2025-01-13 DIAGNOSIS — F41.9 ANXIETY: ICD-10-CM

## 2025-01-13 DIAGNOSIS — E03.9 ACQUIRED HYPOTHYROIDISM: ICD-10-CM

## 2025-01-13 DIAGNOSIS — M85.80 OSTEOPENIA, UNSPECIFIED LOCATION: ICD-10-CM

## 2025-01-13 DIAGNOSIS — E55.9 VITAMIN D DEFICIENCY: ICD-10-CM

## 2025-01-13 DIAGNOSIS — F51.01 PRIMARY INSOMNIA: Primary | ICD-10-CM

## 2025-01-13 PROCEDURE — 3079F DIAST BP 80-89 MM HG: CPT | Performed by: FAMILY MEDICINE

## 2025-01-13 PROCEDURE — 3074F SYST BP LT 130 MM HG: CPT | Performed by: FAMILY MEDICINE

## 2025-01-13 PROCEDURE — 99214 OFFICE O/P EST MOD 30 MIN: CPT | Performed by: FAMILY MEDICINE

## 2025-01-13 RX ORDER — DOXEPIN HYDROCHLORIDE 10 MG/1
10 CAPSULE ORAL NIGHTLY
Qty: 30 CAPSULE | Refills: 0 | Status: SHIPPED | OUTPATIENT
Start: 2025-01-13

## 2025-01-13 RX ORDER — CITALOPRAM HYDROBROMIDE 10 MG/1
10 TABLET ORAL DAILY
Qty: 30 TABLET | Refills: 2 | Status: SHIPPED | OUTPATIENT
Start: 2025-01-13

## 2025-01-13 SDOH — ECONOMIC STABILITY: FOOD INSECURITY: WITHIN THE PAST 12 MONTHS, YOU WORRIED THAT YOUR FOOD WOULD RUN OUT BEFORE YOU GOT MONEY TO BUY MORE.: NEVER TRUE

## 2025-01-13 SDOH — ECONOMIC STABILITY: FOOD INSECURITY: WITHIN THE PAST 12 MONTHS, THE FOOD YOU BOUGHT JUST DIDN'T LAST AND YOU DIDN'T HAVE MONEY TO GET MORE.: NEVER TRUE

## 2025-01-13 NOTE — PROGRESS NOTES
Renuka Mendez (:  1960) is a 64 y.o. female,Established patient, here for evaluation of the following chief complaint(s):  Other (Trouble sleeping/Depressed )      Assessment & Plan   ASSESSMENT/PLAN:  Renuka \"Brianne\" was seen today for other.    Diagnoses and all orders for this visit:    Primary insomnia  Not well controlled.  Trial of doxepin. Consider lunesta if that's not effective.  Medication side affects and adverse reactions reviewed.   Anxiety  Not well controlled. Starting counseling and restarting citalopram  Medication side affects and adverse reactions reviewed.   Other orders  -     doxepin (SINEQUAN) 10 MG capsule; Take 1 capsule by mouth nightly  -     citalopram (CELEXA) 10 MG tablet; Take 1 tablet by mouth daily         No follow-ups on file.         Subjective   SUBJECTIVE/OBJECTIVE:  HPI  Pt is a of 64 y.o. female comes in today with   Chief Complaint   Patient presents with    Other     Trouble sleeping  Depressed      Trouble sleeping.  Getting to sleep and staying asleep more difficult.  Trazodone caused too many side effects   Plans on starting counseling.    Vitals:    25 1514   BP: 118/82   Pulse: 54   SpO2: 98%   Weight: 78 kg (172 lb)   Height: 1.702 m (5' 7\")     Past Medical History:Reviewed  Medications:Reviewed.  No Known Allergies   Social hx:Reviewed.  Social History     Tobacco Use   Smoking Status Never   Smokeless Tobacco Never     Review of Systems   Constitutional: Negative.           Objective   Physical Exam         An electronic signature was used to authenticate this note.    --Rafal Brooks MD

## 2025-01-14 LAB
25(OH)D3 SERPL-MCNC: 37.1 NG/ML
EST. AVERAGE GLUCOSE BLD GHB EST-MCNC: 91.1 MG/DL
HBA1C MFR BLD: 4.8 %
T3FREE SERPL-MCNC: 2.7 PG/ML (ref 2.3–4.2)
T4 FREE SERPL-MCNC: 2.2 NG/DL (ref 0.9–1.8)
TSH SERPL DL<=0.005 MIU/L-ACNC: 0.49 UIU/ML (ref 0.27–4.2)

## 2025-01-21 ENCOUNTER — OFFICE VISIT (OUTPATIENT)
Dept: ENDOCRINOLOGY | Age: 65
End: 2025-01-21
Payer: COMMERCIAL

## 2025-01-21 VITALS
HEART RATE: 51 BPM | OXYGEN SATURATION: 99 % | BODY MASS INDEX: 27 KG/M2 | WEIGHT: 172 LBS | RESPIRATION RATE: 16 BRPM | SYSTOLIC BLOOD PRESSURE: 137 MMHG | DIASTOLIC BLOOD PRESSURE: 71 MMHG | HEIGHT: 67 IN | TEMPERATURE: 98 F

## 2025-01-21 DIAGNOSIS — E03.9 ACQUIRED HYPOTHYROIDISM: Primary | ICD-10-CM

## 2025-01-21 DIAGNOSIS — E55.9 VITAMIN D DEFICIENCY: ICD-10-CM

## 2025-01-21 DIAGNOSIS — M85.80 OSTEOPENIA, UNSPECIFIED LOCATION: ICD-10-CM

## 2025-01-21 DIAGNOSIS — R73.01 IFG (IMPAIRED FASTING GLUCOSE): ICD-10-CM

## 2025-01-21 DIAGNOSIS — Z78.0 MENOPAUSE: ICD-10-CM

## 2025-01-21 PROCEDURE — 99214 OFFICE O/P EST MOD 30 MIN: CPT | Performed by: INTERNAL MEDICINE

## 2025-01-21 PROCEDURE — 3075F SYST BP GE 130 - 139MM HG: CPT | Performed by: INTERNAL MEDICINE

## 2025-01-21 PROCEDURE — 3078F DIAST BP <80 MM HG: CPT | Performed by: INTERNAL MEDICINE

## 2025-01-21 RX ORDER — LEVOTHYROXINE SODIUM 100 UG/1
TABLET ORAL
Qty: 35 TABLET | Refills: 11 | Status: SHIPPED | OUTPATIENT
Start: 2025-01-21

## 2025-01-21 NOTE — PROGRESS NOTES
SUBJECTIVE:  Renuka Mendez is a 64 y.o. female who is here for hypothyroidism.     1. Acquired hypothyroidism    This started in 2010. Patient was diagnosed with hypothyroidism. The problem has been unchanged.   Patient started medication in 2015. Currently patient is on: levothyroxine, liothyronine. Misses  0 doses a month.    Current complaints:  fatigue, palpitations  Has a lot of stress at work.  Exercises, eating better    History of obstructive symptoms: difficulty swallowing No, changes in voice/hoarseness No.  History of radiation to patient's neck: No  Resent iodine exposure: No  Family history includes hypothyroidism, goiter  Family history of thyroid cancer: No    2. Menopause  Periods stopped at age 52.  No hot flashes on estrogen.    3. Osteopenia, unspecified location  Had stress fractures in tibula.  Healed recent 5th metatarsal left leg fracture.  No bone pain.  Mother or father no hip or spine fractures  No smoking  No RA  No long term steroids    4.  Vitamin D deficiency  No bone pain.    5. IFG   Has family Hx of diabetes      EXAMINATION:   BONE DENSITOMETRY       11/29/2022 9:29 am       TECHNIQUE:   A bone density dual x-ray absorptiometry (DXA) scan was performed of the   lumbar spine and bilateral hips on a Dexetra System.       COMPARISON:   07/23/2018       HISTORY:   ORDERING SYSTEM PROVIDED HISTORY: Osteopenia, unspecified location       Gender: F       Age: 61 y/o       FINDINGS:   LUMBAR SPINE: L1-L4       BMD: 0.940 g/cm2       T-score: -1.0       Z-score: 0.5       There has been no statistically significant change in the bone mineral   density of the lumbar spine since the prior exam date listed above.       LEFT TOTAL HIP:       BMD: 0.755 g/cm2       T-score: -1.5       Z-score: -0.6       LEFT FEMORAL NECK:   BMD: 0.651 g/cm2   T-score: -1.8       Z-score: -0.5       There has been a DECREASE of 4.2% in the bone mineral density of the total   hip since the prior exam date listed

## 2025-02-11 RX ORDER — DOXEPIN HYDROCHLORIDE 10 MG/1
10 CAPSULE ORAL NIGHTLY
Qty: 30 CAPSULE | Refills: 0 | Status: SHIPPED | OUTPATIENT
Start: 2025-02-11 | End: 2025-02-14

## 2025-02-11 NOTE — TELEPHONE ENCOUNTER
Medication:   Requested Prescriptions     Pending Prescriptions Disp Refills    doxepin (SINEQUAN) 10 MG capsule [Pharmacy Med Name: DOXEPIN 10MG CAPSULES] 30 capsule 0     Sig: TAKE 1 CAPSULE BY MOUTH EVERY NIGHT       Last Filled:  1/13/25    Patient Phone Number: [phone] (home)     Last appt: 1/13/2025   Next appt: Visit date not found    Last Labs DM:   Lab Results   Component Value Date/Time    LABA1C 4.8 01/13/2025 07:41 AM     Last Lipid:   Lab Results   Component Value Date/Time    CHOL 157 01/06/2022 07:46 AM    TRIG 87 01/06/2022 07:46 AM    HDL 54 01/06/2022 07:46 AM     Last PSA: No results found for: \"PSA\"  Last Thyroid:   Lab Results   Component Value Date/Time    TSH 0.49 01/13/2025 07:41 AM    TSH 1.42 06/26/2024 03:26 PM    FT3 2.7 01/13/2025 07:41 AM    T4FREE 2.2 01/13/2025 07:41 AM

## 2025-02-14 RX ORDER — DOXEPIN HYDROCHLORIDE 10 MG/1
10 CAPSULE ORAL NIGHTLY
Qty: 30 CAPSULE | Refills: 2 | Status: SHIPPED | OUTPATIENT
Start: 2025-02-14

## 2025-02-14 NOTE — TELEPHONE ENCOUNTER
Medication:   Requested Prescriptions     Pending Prescriptions Disp Refills    doxepin (SINEQUAN) 10 MG capsule [Pharmacy Med Name: DOXEPIN 10MG CAPSULES] 30 capsule 0     Sig: TAKE 1 CAPSULE BY MOUTH EVERY NIGHT        Last Filled:      [facility] to: [id number] [address] - P [phone] - F [phone]     Patient Phone Number: [phone] (home)     Last appt: 1/13/2025   Next appt: Visit date not found    Last OARRS:        No data to display

## 2025-04-09 RX ORDER — CITALOPRAM HYDROBROMIDE 10 MG/1
10 TABLET ORAL DAILY
Qty: 30 TABLET | Refills: 2 | Status: SHIPPED | OUTPATIENT
Start: 2025-04-09

## 2025-04-09 NOTE — TELEPHONE ENCOUNTER
Medication:   Requested Prescriptions     Pending Prescriptions Disp Refills    citalopram (CELEXA) 10 MG tablet [Pharmacy Med Name: CITALOPRAM 10MG TABLETS] 30 tablet 2     Sig: TAKE 1 TABLET BY MOUTH DAILY       Last Filled:  1/13/25    Patient Phone Number: [phone] (home)     Last appt: 1/13/2025   Next appt: Visit date not found    Last Labs DM:   Lab Results   Component Value Date/Time    LABA1C 4.8 01/13/2025 07:41 AM     Last Lipid:   Lab Results   Component Value Date/Time    CHOL 157 01/06/2022 07:46 AM    TRIG 87 01/06/2022 07:46 AM    HDL 54 01/06/2022 07:46 AM     Last PSA: No results found for: \"PSA\"  Last Thyroid:   Lab Results   Component Value Date/Time    TSH 0.49 01/13/2025 07:41 AM    TSH 1.42 06/26/2024 03:26 PM    FT3 2.7 01/13/2025 07:41 AM    T4FREE 2.2 01/13/2025 07:41 AM

## 2025-05-15 NOTE — TELEPHONE ENCOUNTER
Medication:   Requested Prescriptions     Pending Prescriptions Disp Refills    doxepin (SINEQUAN) 10 MG capsule [Pharmacy Med Name: DOXEPIN 10MG CAPSULES] 30 capsule 2     Sig: TAKE 1 CAPSULE BY MOUTH EVERY NIGHT        Last Filled:  2/14/25    Patient Phone Number: [phone] (home)     Last appt: 1/13/2025   Next appt: Visit date not found    Last OARRS:        No data to display

## 2025-05-19 RX ORDER — DOXEPIN HYDROCHLORIDE 10 MG/1
10 CAPSULE ORAL NIGHTLY
Qty: 30 CAPSULE | Refills: 2 | Status: SHIPPED | OUTPATIENT
Start: 2025-05-19

## 2025-06-13 RX ORDER — CITALOPRAM HYDROBROMIDE 10 MG/1
10 TABLET ORAL DAILY
Qty: 30 TABLET | Refills: 2 | Status: SHIPPED | OUTPATIENT
Start: 2025-06-13

## 2025-06-13 NOTE — TELEPHONE ENCOUNTER
Medication:   Requested Prescriptions     Pending Prescriptions Disp Refills    citalopram (CELEXA) 10 MG tablet [Pharmacy Med Name: CITALOPRAM 10MG TABLETS] 30 tablet 2     Sig: TAKE 1 TABLET BY MOUTH DAILY        Last Filled:  4/9/25    Patient Phone Number: [phone] (home)     Last appt: 1/13/2025   Next appt: Visit date not found    Last OARRS:        No data to display

## 2025-07-01 ENCOUNTER — HOSPITAL ENCOUNTER (OUTPATIENT)
Dept: MAMMOGRAPHY | Age: 65
Discharge: HOME | End: 2025-07-01
Payer: COMMERCIAL

## 2025-07-01 DIAGNOSIS — Z12.31 ENCOUNTER FOR SCREENING MAMMOGRAM FOR BREAST CANCER: ICD-10-CM

## 2025-07-01 PROCEDURE — 77063 BREAST TOMOSYNTHESIS BI: CPT

## 2025-08-26 RX ORDER — DOXEPIN HYDROCHLORIDE 10 MG/1
10 CAPSULE ORAL NIGHTLY
Qty: 30 CAPSULE | Refills: 2 | Status: SHIPPED | OUTPATIENT
Start: 2025-08-26

## 2025-09-03 ENCOUNTER — OFFICE VISIT (OUTPATIENT)
Dept: FAMILY MEDICINE CLINIC | Age: 65
End: 2025-09-03
Payer: COMMERCIAL

## 2025-09-03 VITALS
TEMPERATURE: 97.3 F | SYSTOLIC BLOOD PRESSURE: 118 MMHG | OXYGEN SATURATION: 97 % | DIASTOLIC BLOOD PRESSURE: 64 MMHG | HEIGHT: 67 IN | HEART RATE: 50 BPM | BODY MASS INDEX: 28.63 KG/M2 | WEIGHT: 182.4 LBS

## 2025-09-03 DIAGNOSIS — G71.09: ICD-10-CM

## 2025-09-03 DIAGNOSIS — Z00.00 WELL ADULT EXAM: Primary | ICD-10-CM

## 2025-09-03 PROCEDURE — 3078F DIAST BP <80 MM HG: CPT | Performed by: FAMILY MEDICINE

## 2025-09-03 PROCEDURE — 99396 PREV VISIT EST AGE 40-64: CPT | Performed by: FAMILY MEDICINE

## 2025-09-03 PROCEDURE — 3074F SYST BP LT 130 MM HG: CPT | Performed by: FAMILY MEDICINE

## 2025-09-03 RX ORDER — LOTEPREDNOL ETABONATE 5 MG/ML
SUSPENSION/ DROPS OPHTHALMIC
COMMUNITY